# Patient Record
Sex: MALE | Race: WHITE | NOT HISPANIC OR LATINO | Employment: FULL TIME | URBAN - NONMETROPOLITAN AREA
[De-identification: names, ages, dates, MRNs, and addresses within clinical notes are randomized per-mention and may not be internally consistent; named-entity substitution may affect disease eponyms.]

---

## 2020-11-24 ENCOUNTER — APPOINTMENT (EMERGENCY)
Dept: CT IMAGING | Facility: HOSPITAL | Age: 60
End: 2020-11-24
Payer: COMMERCIAL

## 2020-11-24 ENCOUNTER — HOSPITAL ENCOUNTER (INPATIENT)
Facility: HOSPITAL | Age: 60
LOS: 1 days | Discharge: HOME | DRG: 287 | End: 2020-11-25
Attending: FAMILY MEDICINE | Admitting: INTERNAL MEDICINE
Payer: COMMERCIAL

## 2020-11-24 ENCOUNTER — HOSPITAL ENCOUNTER (EMERGENCY)
Facility: HOSPITAL | Age: 60
End: 2020-11-24
Attending: EMERGENCY MEDICINE | Admitting: EMERGENCY MEDICINE
Payer: COMMERCIAL

## 2020-11-24 ENCOUNTER — APPOINTMENT (EMERGENCY)
Dept: RADIOLOGY | Facility: HOSPITAL | Age: 60
End: 2020-11-24
Payer: COMMERCIAL

## 2020-11-24 VITALS
DIASTOLIC BLOOD PRESSURE: 78 MMHG | WEIGHT: 201.06 LBS | BODY MASS INDEX: 27.27 KG/M2 | RESPIRATION RATE: 20 BRPM | OXYGEN SATURATION: 98 % | HEART RATE: 77 BPM | SYSTOLIC BLOOD PRESSURE: 128 MMHG | TEMPERATURE: 97.3 F

## 2020-11-24 DIAGNOSIS — I21.4 NSTEMI (NON-ST ELEVATED MYOCARDIAL INFARCTION) (HCC): ICD-10-CM

## 2020-11-24 DIAGNOSIS — R94.31 ST SEGMENT DEPRESSION: ICD-10-CM

## 2020-11-24 DIAGNOSIS — R07.82 INTERCOSTAL PAIN: Primary | ICD-10-CM

## 2020-11-24 DIAGNOSIS — K21.9 GASTROESOPHAGEAL REFLUX DISEASE WITHOUT ESOPHAGITIS: Chronic | ICD-10-CM

## 2020-11-24 DIAGNOSIS — R77.8 ELEVATED TROPONIN: ICD-10-CM

## 2020-11-24 DIAGNOSIS — R07.9 CHEST PAIN: Primary | ICD-10-CM

## 2020-11-24 PROBLEM — Z72.0 TOBACCO ABUSE: Chronic | Status: ACTIVE | Noted: 2020-11-24

## 2020-11-24 LAB
ALBUMIN SERPL BCP-MCNC: 4 G/DL (ref 3.5–5)
ALP SERPL-CCNC: 62 U/L (ref 46–116)
ALT SERPL W P-5'-P-CCNC: 37 U/L (ref 12–78)
ANION GAP SERPL CALCULATED.3IONS-SCNC: 13 MMOL/L (ref 4–13)
APTT PPP: 24 SECONDS (ref 23–37)
AST SERPL W P-5'-P-CCNC: 18 U/L (ref 5–45)
BASOPHILS # BLD AUTO: 0.07 THOUSANDS/ΜL (ref 0–0.1)
BASOPHILS NFR BLD AUTO: 1 % (ref 0–1)
BILIRUB SERPL-MCNC: 0.6 MG/DL (ref 0.2–1)
BUN SERPL-MCNC: 12 MG/DL (ref 5–25)
CALCIUM SERPL-MCNC: 9.5 MG/DL (ref 8.3–10.1)
CHLORIDE SERPL-SCNC: 97 MMOL/L (ref 100–108)
CO2 SERPL-SCNC: 23 MMOL/L (ref 21–32)
CREAT SERPL-MCNC: 1.09 MG/DL (ref 0.6–1.3)
EOSINOPHIL # BLD AUTO: 0.35 THOUSAND/ΜL (ref 0–0.61)
EOSINOPHIL NFR BLD AUTO: 2 % (ref 0–6)
ERYTHROCYTE [DISTWIDTH] IN BLOOD BY AUTOMATED COUNT: 12 % (ref 11.6–15.1)
GFR SERPL CREATININE-BSD FRML MDRD: 74 ML/MIN/1.73SQ M
GLUCOSE SERPL-MCNC: 127 MG/DL (ref 65–140)
HCT VFR BLD AUTO: 46.4 % (ref 36.5–49.3)
HGB BLD-MCNC: 16.3 G/DL (ref 12–17)
IMM GRANULOCYTES # BLD AUTO: 0.19 THOUSAND/UL (ref 0–0.2)
IMM GRANULOCYTES NFR BLD AUTO: 1 % (ref 0–2)
INR PPP: 0.97 (ref 0.84–1.19)
LIPASE SERPL-CCNC: 102 U/L (ref 73–393)
LYMPHOCYTES # BLD AUTO: 5.49 THOUSANDS/ΜL (ref 0.6–4.47)
LYMPHOCYTES NFR BLD AUTO: 37 % (ref 14–44)
MCH RBC QN AUTO: 34 PG (ref 26.8–34.3)
MCHC RBC AUTO-ENTMCNC: 35.1 G/DL (ref 31.4–37.4)
MCV RBC AUTO: 97 FL (ref 82–98)
MONOCYTES # BLD AUTO: 1.52 THOUSAND/ΜL (ref 0.17–1.22)
MONOCYTES NFR BLD AUTO: 10 % (ref 4–12)
NEUTROPHILS # BLD AUTO: 7.07 THOUSANDS/ΜL (ref 1.85–7.62)
NEUTS SEG NFR BLD AUTO: 49 % (ref 43–75)
NRBC BLD AUTO-RTO: 0 /100 WBCS
PLATELET # BLD AUTO: 312 THOUSANDS/UL (ref 149–390)
PMV BLD AUTO: 9.8 FL (ref 8.9–12.7)
POTASSIUM SERPL-SCNC: 3.6 MMOL/L (ref 3.5–5.3)
PROT SERPL-MCNC: 7.9 G/DL (ref 6.4–8.2)
PROTHROMBIN TIME: 12.7 SECONDS (ref 11.6–14.5)
RBC # BLD AUTO: 4.8 MILLION/UL (ref 3.88–5.62)
SODIUM SERPL-SCNC: 133 MMOL/L (ref 136–145)
TROPONIN I SERPL-MCNC: 0.64 NG/ML
TROPONIN I SERPL-MCNC: 1.29 NG/ML
TROPONIN I SERPL-MCNC: 1.97 NG/ML
TROPONIN I SERPL-MCNC: <0.02 NG/ML
WBC # BLD AUTO: 14.69 THOUSAND/UL (ref 4.31–10.16)

## 2020-11-24 PROCEDURE — 99285 EMERGENCY DEPT VISIT HI MDM: CPT

## 2020-11-24 PROCEDURE — 84484 ASSAY OF TROPONIN QUANT: CPT | Performed by: INTERNAL MEDICINE

## 2020-11-24 PROCEDURE — 99223 1ST HOSP IP/OBS HIGH 75: CPT | Performed by: INTERNAL MEDICINE

## 2020-11-24 PROCEDURE — 74174 CTA ABD&PLVS W/CONTRAST: CPT

## 2020-11-24 PROCEDURE — G1004 CDSM NDSC: HCPCS

## 2020-11-24 PROCEDURE — 96376 TX/PRO/DX INJ SAME DRUG ADON: CPT

## 2020-11-24 PROCEDURE — 85025 COMPLETE CBC W/AUTO DIFF WBC: CPT | Performed by: EMERGENCY MEDICINE

## 2020-11-24 PROCEDURE — B2011ZZ PLAIN RADIOGRAPHY OF MULTIPLE CORONARY ARTERIES USING LOW OSMOLAR CONTRAST: ICD-10-PCS | Performed by: INTERNAL MEDICINE

## 2020-11-24 PROCEDURE — 71275 CT ANGIOGRAPHY CHEST: CPT

## 2020-11-24 PROCEDURE — 83690 ASSAY OF LIPASE: CPT | Performed by: EMERGENCY MEDICINE

## 2020-11-24 PROCEDURE — 4A023N7 MEASUREMENT OF CARDIAC SAMPLING AND PRESSURE, LEFT HEART, PERCUTANEOUS APPROACH: ICD-10-PCS | Performed by: INTERNAL MEDICINE

## 2020-11-24 PROCEDURE — 84484 ASSAY OF TROPONIN QUANT: CPT | Performed by: EMERGENCY MEDICINE

## 2020-11-24 PROCEDURE — 85730 THROMBOPLASTIN TIME PARTIAL: CPT | Performed by: PHYSICIAN ASSISTANT

## 2020-11-24 PROCEDURE — 85610 PROTHROMBIN TIME: CPT | Performed by: PHYSICIAN ASSISTANT

## 2020-11-24 PROCEDURE — 80053 COMPREHEN METABOLIC PANEL: CPT | Performed by: EMERGENCY MEDICINE

## 2020-11-24 PROCEDURE — 96374 THER/PROPH/DIAG INJ IV PUSH: CPT

## 2020-11-24 PROCEDURE — 36415 COLL VENOUS BLD VENIPUNCTURE: CPT | Performed by: EMERGENCY MEDICINE

## 2020-11-24 PROCEDURE — 93005 ELECTROCARDIOGRAM TRACING: CPT

## 2020-11-24 PROCEDURE — 99291 CRITICAL CARE FIRST HOUR: CPT | Performed by: EMERGENCY MEDICINE

## 2020-11-24 RX ORDER — NITROGLYCERIN 0.4 MG/1
0.4 TABLET SUBLINGUAL
Status: DISCONTINUED | OUTPATIENT
Start: 2020-11-24 | End: 2020-11-25

## 2020-11-24 RX ORDER — HEPARIN SODIUM 5000 [USP'U]/ML
5000 INJECTION, SOLUTION INTRAVENOUS; SUBCUTANEOUS EVERY 8 HOURS SCHEDULED
Status: DISCONTINUED | OUTPATIENT
Start: 2020-11-24 | End: 2020-11-24 | Stop reason: SDUPTHER

## 2020-11-24 RX ORDER — SODIUM CHLORIDE 9 MG/ML
100 INJECTION, SOLUTION INTRAVENOUS ONCE
Status: COMPLETED | OUTPATIENT
Start: 2020-11-25 | End: 2020-11-25

## 2020-11-24 RX ORDER — ASPIRIN 81 MG/1
81 TABLET ORAL DAILY
Status: DISCONTINUED | OUTPATIENT
Start: 2020-11-25 | End: 2020-11-25 | Stop reason: HOSPADM

## 2020-11-24 RX ORDER — HEPARIN SODIUM 1000 [USP'U]/ML
4000 INJECTION, SOLUTION INTRAVENOUS; SUBCUTANEOUS ONCE
Status: COMPLETED | OUTPATIENT
Start: 2020-11-24 | End: 2020-11-24

## 2020-11-24 RX ORDER — MORPHINE SULFATE 4 MG/ML
4 INJECTION, SOLUTION INTRAMUSCULAR; INTRAVENOUS ONCE
Status: COMPLETED | OUTPATIENT
Start: 2020-11-24 | End: 2020-11-24

## 2020-11-24 RX ORDER — HEPARIN SODIUM 10000 [USP'U]/100ML
3-20 INJECTION, SOLUTION INTRAVENOUS
Status: DISCONTINUED | OUTPATIENT
Start: 2020-11-24 | End: 2020-11-25

## 2020-11-24 RX ORDER — SODIUM CHLORIDE 9 MG/ML
3 INJECTION INTRAVENOUS
Status: DISCONTINUED | OUTPATIENT
Start: 2020-11-24 | End: 2020-11-24 | Stop reason: HOSPADM

## 2020-11-24 RX ORDER — OMEPRAZOLE 20 MG/1
1 CAPSULE, DELAYED RELEASE ORAL DAILY
COMMUNITY
End: 2020-11-25 | Stop reason: HOSPADM

## 2020-11-24 RX ORDER — CLOPIDOGREL BISULFATE 75 MG/1
300 TABLET ORAL ONCE
Status: COMPLETED | OUTPATIENT
Start: 2020-11-24 | End: 2020-11-24

## 2020-11-24 RX ORDER — ASPIRIN 81 MG/1
324 TABLET, CHEWABLE ORAL ONCE
Status: COMPLETED | OUTPATIENT
Start: 2020-11-24 | End: 2020-11-24

## 2020-11-24 RX ORDER — MAGNESIUM HYDROXIDE/ALUMINUM HYDROXICE/SIMETHICONE 120; 1200; 1200 MG/30ML; MG/30ML; MG/30ML
30 SUSPENSION ORAL EVERY 2 HOUR PRN
Status: DISCONTINUED | OUTPATIENT
Start: 2020-11-24 | End: 2020-11-25 | Stop reason: HOSPADM

## 2020-11-24 RX ORDER — MAGNESIUM HYDROXIDE/ALUMINUM HYDROXICE/SIMETHICONE 120; 1200; 1200 MG/30ML; MG/30ML; MG/30ML
30 SUSPENSION ORAL ONCE
Status: COMPLETED | OUTPATIENT
Start: 2020-11-24 | End: 2020-11-24

## 2020-11-24 RX ORDER — HEPARIN SODIUM 1000 [USP'U]/ML
2000 INJECTION, SOLUTION INTRAVENOUS; SUBCUTANEOUS
Status: DISCONTINUED | OUTPATIENT
Start: 2020-11-24 | End: 2020-11-25

## 2020-11-24 RX ORDER — CLOPIDOGREL BISULFATE 75 MG/1
75 TABLET ORAL DAILY
Status: DISCONTINUED | OUTPATIENT
Start: 2020-11-25 | End: 2020-11-25

## 2020-11-24 RX ORDER — PANTOPRAZOLE SODIUM 20 MG/1
20 TABLET, DELAYED RELEASE ORAL
Status: DISCONTINUED | OUTPATIENT
Start: 2020-11-24 | End: 2020-11-25 | Stop reason: HOSPADM

## 2020-11-24 RX ORDER — HEPARIN SODIUM 1000 [USP'U]/ML
4000 INJECTION, SOLUTION INTRAVENOUS; SUBCUTANEOUS
Status: DISCONTINUED | OUTPATIENT
Start: 2020-11-24 | End: 2020-11-25

## 2020-11-24 RX ORDER — MAGNESIUM HYDROXIDE/ALUMINUM HYDROXICE/SIMETHICONE 120; 1200; 1200 MG/30ML; MG/30ML; MG/30ML
30 SUSPENSION ORAL EVERY 4 HOURS PRN
Status: DISCONTINUED | OUTPATIENT
Start: 2020-11-24 | End: 2020-11-24

## 2020-11-24 RX ADMIN — PANTOPRAZOLE SODIUM 20 MG: 20 TABLET, DELAYED RELEASE ORAL at 17:39

## 2020-11-24 RX ADMIN — HEPARIN SODIUM 4000 UNITS: 1000 INJECTION INTRAVENOUS; SUBCUTANEOUS at 23:47

## 2020-11-24 RX ADMIN — MORPHINE SULFATE 4 MG: 4 INJECTION, SOLUTION INTRAMUSCULAR; INTRAVENOUS at 11:12

## 2020-11-24 RX ADMIN — MORPHINE SULFATE 4 MG: 4 INJECTION, SOLUTION INTRAMUSCULAR; INTRAVENOUS at 09:07

## 2020-11-24 RX ADMIN — HEPARIN SODIUM 11.1 UNITS/KG/HR: 10000 INJECTION, SOLUTION INTRAVENOUS at 23:46

## 2020-11-24 RX ADMIN — ASPIRIN 81 MG CHEWABLE TABLET 324 MG: 81 TABLET CHEWABLE at 14:20

## 2020-11-24 RX ADMIN — ALUMINUM HYDROXIDE, MAGNESIUM HYDROXIDE, AND SIMETHICONE 30 ML: 200; 200; 20 SUSPENSION ORAL at 11:12

## 2020-11-24 RX ADMIN — CLOPIDOGREL BISULFATE 300 MG: 75 TABLET ORAL at 17:39

## 2020-11-24 RX ADMIN — IOHEXOL 100 ML: 350 INJECTION, SOLUTION INTRAVENOUS at 10:22

## 2020-11-24 RX ADMIN — HEPARIN SODIUM 5000 UNITS: 5000 INJECTION INTRAVENOUS; SUBCUTANEOUS at 17:40

## 2020-11-24 RX ADMIN — ALUMINUM HYDROXIDE, MAGNESIUM HYDROXIDE, AND SIMETHICONE 30 ML: 200; 200; 20 SUSPENSION ORAL at 17:38

## 2020-11-25 ENCOUNTER — APPOINTMENT (INPATIENT)
Dept: NON INVASIVE DIAGNOSTICS | Facility: HOSPITAL | Age: 60
DRG: 287 | End: 2020-11-25
Payer: COMMERCIAL

## 2020-11-25 VITALS
SYSTOLIC BLOOD PRESSURE: 140 MMHG | RESPIRATION RATE: 18 BRPM | OXYGEN SATURATION: 99 % | DIASTOLIC BLOOD PRESSURE: 81 MMHG | TEMPERATURE: 97.5 F | HEART RATE: 80 BPM

## 2020-11-25 LAB
ANION GAP SERPL CALCULATED.3IONS-SCNC: 8 MMOL/L (ref 4–13)
APTT PPP: 54 SECONDS (ref 23–37)
ATRIAL RATE: 72 BPM
ATRIAL RATE: 85 BPM
BUN SERPL-MCNC: 10 MG/DL (ref 5–25)
CALCIUM SERPL-MCNC: 9.4 MG/DL (ref 8.3–10.1)
CHLORIDE SERPL-SCNC: 99 MMOL/L (ref 100–108)
CHOLEST SERPL-MCNC: 197 MG/DL (ref 50–200)
CO2 SERPL-SCNC: 25 MMOL/L (ref 21–32)
CREAT SERPL-MCNC: 0.85 MG/DL (ref 0.6–1.3)
GFR SERPL CREATININE-BSD FRML MDRD: 95 ML/MIN/1.73SQ M
GLUCOSE SERPL-MCNC: 106 MG/DL (ref 65–140)
HDLC SERPL-MCNC: 45 MG/DL
LDLC SERPL CALC-MCNC: 137 MG/DL (ref 0–100)
NONHDLC SERPL-MCNC: 152 MG/DL
P AXIS: 68 DEGREES
P AXIS: 71 DEGREES
PLATELET # BLD AUTO: 244 THOUSANDS/UL (ref 149–390)
PMV BLD AUTO: 10 FL (ref 8.9–12.7)
POTASSIUM SERPL-SCNC: 4.3 MMOL/L (ref 3.5–5.3)
PR INTERVAL: 136 MS
PR INTERVAL: 138 MS
QRS AXIS: 65 DEGREES
QRS AXIS: 76 DEGREES
QRSD INTERVAL: 82 MS
QRSD INTERVAL: 88 MS
QT INTERVAL: 368 MS
QT INTERVAL: 390 MS
QTC INTERVAL: 427 MS
QTC INTERVAL: 437 MS
SODIUM SERPL-SCNC: 132 MMOL/L (ref 136–145)
T WAVE AXIS: 131 DEGREES
T WAVE AXIS: 55 DEGREES
TRIGL SERPL-MCNC: 75 MG/DL
TROPONIN I SERPL-MCNC: 3.53 NG/ML
TROPONIN I SERPL-MCNC: 4.15 NG/ML
VENTRICULAR RATE: 72 BPM
VENTRICULAR RATE: 85 BPM

## 2020-11-25 PROCEDURE — 99153 MOD SED SAME PHYS/QHP EA: CPT | Performed by: PHYSICIAN ASSISTANT

## 2020-11-25 PROCEDURE — C1769 GUIDE WIRE: HCPCS | Performed by: PHYSICIAN ASSISTANT

## 2020-11-25 PROCEDURE — 99152 MOD SED SAME PHYS/QHP 5/>YRS: CPT | Performed by: PHYSICIAN ASSISTANT

## 2020-11-25 PROCEDURE — 93010 ELECTROCARDIOGRAM REPORT: CPT | Performed by: INTERNAL MEDICINE

## 2020-11-25 PROCEDURE — 84484 ASSAY OF TROPONIN QUANT: CPT | Performed by: INTERNAL MEDICINE

## 2020-11-25 PROCEDURE — 93458 L HRT ARTERY/VENTRICLE ANGIO: CPT | Performed by: PHYSICIAN ASSISTANT

## 2020-11-25 PROCEDURE — 80048 BASIC METABOLIC PNL TOTAL CA: CPT | Performed by: INTERNAL MEDICINE

## 2020-11-25 PROCEDURE — 99152 MOD SED SAME PHYS/QHP 5/>YRS: CPT | Performed by: INTERNAL MEDICINE

## 2020-11-25 PROCEDURE — 85730 THROMBOPLASTIN TIME PARTIAL: CPT | Performed by: INTERNAL MEDICINE

## 2020-11-25 PROCEDURE — 80061 LIPID PANEL: CPT | Performed by: INTERNAL MEDICINE

## 2020-11-25 PROCEDURE — 99232 SBSQ HOSP IP/OBS MODERATE 35: CPT | Performed by: INTERNAL MEDICINE

## 2020-11-25 PROCEDURE — 85049 AUTOMATED PLATELET COUNT: CPT | Performed by: INTERNAL MEDICINE

## 2020-11-25 PROCEDURE — 99239 HOSP IP/OBS DSCHRG MGMT >30: CPT | Performed by: INTERNAL MEDICINE

## 2020-11-25 PROCEDURE — NC001 PR NO CHARGE: Performed by: INTERNAL MEDICINE

## 2020-11-25 PROCEDURE — C1894 INTRO/SHEATH, NON-LASER: HCPCS | Performed by: PHYSICIAN ASSISTANT

## 2020-11-25 PROCEDURE — 93458 L HRT ARTERY/VENTRICLE ANGIO: CPT | Performed by: INTERNAL MEDICINE

## 2020-11-25 RX ORDER — METOPROLOL SUCCINATE 25 MG/1
25 TABLET, EXTENDED RELEASE ORAL DAILY
Status: DISCONTINUED | OUTPATIENT
Start: 2020-11-25 | End: 2020-11-25

## 2020-11-25 RX ORDER — MAGNESIUM HYDROXIDE/ALUMINUM HYDROXICE/SIMETHICONE 120; 1200; 1200 MG/30ML; MG/30ML; MG/30ML
30 SUSPENSION ORAL EVERY 2 HOUR PRN
Qty: 355 ML | Refills: 0
Start: 2020-11-25

## 2020-11-25 RX ORDER — HEPARIN SODIUM 1000 [USP'U]/ML
INJECTION, SOLUTION INTRAVENOUS; SUBCUTANEOUS CODE/TRAUMA/SEDATION MEDICATION
Status: COMPLETED | OUTPATIENT
Start: 2020-11-25 | End: 2020-11-25

## 2020-11-25 RX ORDER — LIDOCAINE HYDROCHLORIDE 10 MG/ML
INJECTION, SOLUTION EPIDURAL; INFILTRATION; INTRACAUDAL; PERINEURAL CODE/TRAUMA/SEDATION MEDICATION
Status: COMPLETED | OUTPATIENT
Start: 2020-11-25 | End: 2020-11-25

## 2020-11-25 RX ORDER — METOPROLOL SUCCINATE 25 MG/1
25 TABLET, EXTENDED RELEASE ORAL DAILY
Status: DISCONTINUED | OUTPATIENT
Start: 2020-11-26 | End: 2020-11-25 | Stop reason: HOSPADM

## 2020-11-25 RX ORDER — METOPROLOL SUCCINATE 25 MG/1
25 TABLET, EXTENDED RELEASE ORAL DAILY
Qty: 30 TABLET | Refills: 0 | Status: SHIPPED | OUTPATIENT
Start: 2020-11-25

## 2020-11-25 RX ORDER — ATORVASTATIN CALCIUM 20 MG/1
20 TABLET, FILM COATED ORAL
Qty: 30 TABLET | Refills: 0 | Status: SHIPPED | OUTPATIENT
Start: 2020-11-25 | End: 2020-12-01

## 2020-11-25 RX ORDER — ATORVASTATIN CALCIUM 10 MG/1
20 TABLET, FILM COATED ORAL
Status: DISCONTINUED | OUTPATIENT
Start: 2020-11-25 | End: 2020-11-25 | Stop reason: HOSPADM

## 2020-11-25 RX ORDER — NITROGLYCERIN 20 MG/100ML
INJECTION INTRAVENOUS CODE/TRAUMA/SEDATION MEDICATION
Status: COMPLETED | OUTPATIENT
Start: 2020-11-25 | End: 2020-11-25

## 2020-11-25 RX ORDER — PANTOPRAZOLE SODIUM 20 MG/1
20 TABLET, DELAYED RELEASE ORAL
Qty: 60 TABLET | Refills: 0 | Status: SHIPPED | OUTPATIENT
Start: 2020-11-25 | End: 2020-12-08 | Stop reason: ALTCHOICE

## 2020-11-25 RX ORDER — MIDAZOLAM HYDROCHLORIDE 2 MG/2ML
INJECTION, SOLUTION INTRAMUSCULAR; INTRAVENOUS CODE/TRAUMA/SEDATION MEDICATION
Status: COMPLETED | OUTPATIENT
Start: 2020-11-25 | End: 2020-11-25

## 2020-11-25 RX ORDER — FENTANYL CITRATE 50 UG/ML
INJECTION, SOLUTION INTRAMUSCULAR; INTRAVENOUS CODE/TRAUMA/SEDATION MEDICATION
Status: COMPLETED | OUTPATIENT
Start: 2020-11-25 | End: 2020-11-25

## 2020-11-25 RX ORDER — PANTOPRAZOLE SODIUM 20 MG/1
20 TABLET, DELAYED RELEASE ORAL
Qty: 60 TABLET | Refills: 0 | Status: SHIPPED | OUTPATIENT
Start: 2020-11-25 | End: 2020-11-25

## 2020-11-25 RX ORDER — VERAPAMIL HYDROCHLORIDE 2.5 MG/ML
INJECTION, SOLUTION INTRAVENOUS CODE/TRAUMA/SEDATION MEDICATION
Status: COMPLETED | OUTPATIENT
Start: 2020-11-25 | End: 2020-11-25

## 2020-11-25 RX ORDER — ASPIRIN 81 MG/1
81 TABLET ORAL DAILY
Refills: 0
Start: 2020-11-26

## 2020-11-25 RX ORDER — SODIUM CHLORIDE 9 MG/ML
125 INJECTION, SOLUTION INTRAVENOUS CONTINUOUS
Status: DISCONTINUED | OUTPATIENT
Start: 2020-11-25 | End: 2020-11-25

## 2020-11-25 RX ORDER — METOPROLOL SUCCINATE 25 MG/1
25 TABLET, EXTENDED RELEASE ORAL 2 TIMES DAILY
Status: DISCONTINUED | OUTPATIENT
Start: 2020-11-26 | End: 2020-11-25

## 2020-11-25 RX ADMIN — FENTANYL CITRATE 50 MCG: 50 INJECTION, SOLUTION INTRAMUSCULAR; INTRAVENOUS at 08:21

## 2020-11-25 RX ADMIN — IOHEXOL 63 ML: 350 INJECTION, SOLUTION INTRAVENOUS at 08:39

## 2020-11-25 RX ADMIN — ATORVASTATIN CALCIUM 20 MG: 10 TABLET, FILM COATED ORAL at 16:02

## 2020-11-25 RX ADMIN — METOPROLOL SUCCINATE 25 MG: 25 TABLET, EXTENDED RELEASE ORAL at 16:02

## 2020-11-25 RX ADMIN — LIDOCAINE HYDROCHLORIDE 1 ML: 10 INJECTION, SOLUTION EPIDURAL; INFILTRATION; INTRACAUDAL; PERINEURAL at 08:23

## 2020-11-25 RX ADMIN — NITROGLYCERIN 200 MCG: 20 INJECTION INTRAVENOUS at 08:27

## 2020-11-25 RX ADMIN — PANTOPRAZOLE SODIUM 20 MG: 20 TABLET, DELAYED RELEASE ORAL at 16:02

## 2020-11-25 RX ADMIN — MIDAZOLAM 2 MG: 1 INJECTION INTRAMUSCULAR; INTRAVENOUS at 08:21

## 2020-11-25 RX ADMIN — HEPARIN SODIUM 4000 UNITS: 1000 INJECTION INTRAVENOUS; SUBCUTANEOUS at 08:26

## 2020-11-25 RX ADMIN — SODIUM CHLORIDE 125 ML/HR: 0.9 INJECTION, SOLUTION INTRAVENOUS at 09:00

## 2020-11-25 RX ADMIN — CLOPIDOGREL BISULFATE 75 MG: 75 TABLET ORAL at 08:02

## 2020-11-25 RX ADMIN — SODIUM CHLORIDE 100 ML/HR: 0.9 INJECTION, SOLUTION INTRAVENOUS at 06:23

## 2020-11-25 RX ADMIN — PANTOPRAZOLE SODIUM 20 MG: 20 TABLET, DELAYED RELEASE ORAL at 06:23

## 2020-11-25 RX ADMIN — VERAPAMIL HYDROCHLORIDE 2.5 MG: 2.5 INJECTION, SOLUTION INTRAVENOUS at 08:27

## 2020-11-25 RX ADMIN — ASPIRIN 81 MG: 81 TABLET, COATED ORAL at 08:02

## 2020-11-27 ENCOUNTER — TRANSITIONAL CARE MANAGEMENT (OUTPATIENT)
Dept: FAMILY MEDICINE CLINIC | Facility: CLINIC | Age: 60
End: 2020-11-27

## 2020-12-01 ENCOUNTER — TELEPHONE (OUTPATIENT)
Dept: FAMILY MEDICINE CLINIC | Facility: CLINIC | Age: 60
End: 2020-12-01

## 2020-12-01 ENCOUNTER — OFFICE VISIT (OUTPATIENT)
Dept: FAMILY MEDICINE CLINIC | Facility: CLINIC | Age: 60
End: 2020-12-01
Payer: COMMERCIAL

## 2020-12-01 VITALS
SYSTOLIC BLOOD PRESSURE: 116 MMHG | TEMPERATURE: 98 F | WEIGHT: 204.4 LBS | HEIGHT: 72 IN | DIASTOLIC BLOOD PRESSURE: 80 MMHG | OXYGEN SATURATION: 99 % | HEART RATE: 82 BPM | BODY MASS INDEX: 27.68 KG/M2

## 2020-12-01 DIAGNOSIS — Z71.6 ENCOUNTER FOR SMOKING CESSATION COUNSELING: Primary | ICD-10-CM

## 2020-12-01 DIAGNOSIS — I21.4 NSTEMI (NON-ST ELEVATED MYOCARDIAL INFARCTION) (HCC): ICD-10-CM

## 2020-12-01 DIAGNOSIS — Z76.89 ENCOUNTER FOR SUPPORT AND COORDINATION OF TRANSITION OF CARE: ICD-10-CM

## 2020-12-01 PROCEDURE — 99496 TRANSJ CARE MGMT HIGH F2F 7D: CPT | Performed by: FAMILY MEDICINE

## 2020-12-01 RX ORDER — VARENICLINE TARTRATE 25 MG
KIT ORAL
Qty: 53 TABLET | Refills: 0 | Status: SHIPPED | OUTPATIENT
Start: 2020-12-01 | End: 2020-12-16 | Stop reason: ALTCHOICE

## 2020-12-01 RX ORDER — ATORVASTATIN CALCIUM 40 MG/1
40 TABLET, FILM COATED ORAL
Qty: 30 TABLET | Refills: 1 | Status: SHIPPED | OUTPATIENT
Start: 2020-12-01

## 2020-12-01 RX ORDER — NAPROXEN 500 MG/1
500 TABLET ORAL 2 TIMES DAILY WITH MEALS
COMMUNITY
Start: 2020-11-19 | End: 2020-12-08 | Stop reason: ALTCHOICE

## 2020-12-01 RX ORDER — ATORVASTATIN CALCIUM 40 MG/1
40 TABLET, FILM COATED ORAL
Qty: 30 TABLET | Refills: 1 | Status: SHIPPED | OUTPATIENT
Start: 2020-12-01 | End: 2020-12-01 | Stop reason: SDUPTHER

## 2020-12-04 ENCOUNTER — HOSPITAL ENCOUNTER (OUTPATIENT)
Dept: NON INVASIVE DIAGNOSTICS | Facility: HOSPITAL | Age: 60
Discharge: HOME | End: 2020-12-04
Payer: COMMERCIAL

## 2020-12-04 DIAGNOSIS — I21.4 NSTEMI (NON-ST ELEVATED MYOCARDIAL INFARCTION) (HCC): ICD-10-CM

## 2020-12-04 PROCEDURE — 93306 TTE W/DOPPLER COMPLETE: CPT | Performed by: INTERNAL MEDICINE

## 2020-12-04 PROCEDURE — 93306 TTE W/DOPPLER COMPLETE: CPT

## 2020-12-08 ENCOUNTER — OFFICE VISIT (OUTPATIENT)
Dept: CARDIOLOGY CLINIC | Facility: HOSPITAL | Age: 60
End: 2020-12-08
Payer: COMMERCIAL

## 2020-12-08 VITALS
HEART RATE: 91 BPM | HEIGHT: 72 IN | OXYGEN SATURATION: 98 % | DIASTOLIC BLOOD PRESSURE: 76 MMHG | WEIGHT: 205 LBS | BODY MASS INDEX: 27.77 KG/M2 | SYSTOLIC BLOOD PRESSURE: 124 MMHG

## 2020-12-08 DIAGNOSIS — Z72.0 TOBACCO ABUSE: Chronic | ICD-10-CM

## 2020-12-08 DIAGNOSIS — E78.5 DYSLIPIDEMIA: Primary | ICD-10-CM

## 2020-12-08 PROCEDURE — 99214 OFFICE O/P EST MOD 30 MIN: CPT | Performed by: PHYSICIAN ASSISTANT

## 2020-12-08 PROCEDURE — 4004F PT TOBACCO SCREEN RCVD TLK: CPT | Performed by: PHYSICIAN ASSISTANT

## 2020-12-08 RX ORDER — OMEPRAZOLE 20 MG/1
20 CAPSULE, DELAYED RELEASE ORAL DAILY
COMMUNITY

## 2020-12-16 ENCOUNTER — OFFICE VISIT (OUTPATIENT)
Dept: FAMILY MEDICINE CLINIC | Facility: CLINIC | Age: 60
End: 2020-12-16
Payer: COMMERCIAL

## 2020-12-16 VITALS
WEIGHT: 208.6 LBS | SYSTOLIC BLOOD PRESSURE: 118 MMHG | HEIGHT: 72 IN | TEMPERATURE: 97.2 F | OXYGEN SATURATION: 93 % | DIASTOLIC BLOOD PRESSURE: 82 MMHG | HEART RATE: 88 BPM | BODY MASS INDEX: 28.25 KG/M2

## 2020-12-16 DIAGNOSIS — Z71.6 ENCOUNTER FOR TOBACCO USE CESSATION COUNSELING: Primary | ICD-10-CM

## 2020-12-16 DIAGNOSIS — I21.4 NSTEMI (NON-ST ELEVATED MYOCARDIAL INFARCTION) (HCC): ICD-10-CM

## 2020-12-16 DIAGNOSIS — Z72.0 TOBACCO ABUSE: Chronic | ICD-10-CM

## 2020-12-16 PROCEDURE — 99213 OFFICE O/P EST LOW 20 MIN: CPT | Performed by: FAMILY MEDICINE

## 2020-12-16 PROCEDURE — 3008F BODY MASS INDEX DOCD: CPT | Performed by: PHYSICIAN ASSISTANT

## 2020-12-16 RX ORDER — POLYETHYLENE GLYCOL 3350 17 G
4 POWDER IN PACKET (EA) ORAL AS NEEDED
Qty: 100 EACH | Refills: 0 | Status: SHIPPED | OUTPATIENT
Start: 2020-12-16

## 2020-12-16 RX ORDER — NICOTINE 21 MG/24HR
1 PATCH, TRANSDERMAL 24 HOURS TRANSDERMAL EVERY 24 HOURS
Qty: 28 PATCH | Refills: 0 | Status: SHIPPED | OUTPATIENT
Start: 2020-12-16

## 2021-06-11 ENCOUNTER — TELEPHONE (OUTPATIENT)
Dept: CARDIOLOGY CLINIC | Facility: HOSPITAL | Age: 61
End: 2021-06-11

## 2021-06-11 NOTE — TELEPHONE ENCOUNTER
Attempted to contact Alice Cabello multiple times to schedule his next appointment with cardiology  A letter has been sent to have him contact our office

## 2021-11-22 ENCOUNTER — VBI (OUTPATIENT)
Dept: ADMINISTRATIVE | Facility: OTHER | Age: 61
End: 2021-11-22

## 2021-12-06 PROCEDURE — U0005 INFEC AGEN DETEC AMPLI PROBE: HCPCS | Performed by: FAMILY MEDICINE

## 2021-12-06 PROCEDURE — U0003 INFECTIOUS AGENT DETECTION BY NUCLEIC ACID (DNA OR RNA); SEVERE ACUTE RESPIRATORY SYNDROME CORONAVIRUS 2 (SARS-COV-2) (CORONAVIRUS DISEASE [COVID-19]), AMPLIFIED PROBE TECHNIQUE, MAKING USE OF HIGH THROUGHPUT TECHNOLOGIES AS DESCRIBED BY CMS-2020-01-R: HCPCS | Performed by: FAMILY MEDICINE

## 2022-05-25 ENCOUNTER — OFFICE VISIT (OUTPATIENT)
Dept: URGENT CARE | Facility: MEDICAL CENTER | Age: 62
End: 2022-05-25
Payer: COMMERCIAL

## 2022-05-25 VITALS
WEIGHT: 200 LBS | RESPIRATION RATE: 18 BRPM | TEMPERATURE: 98.3 F | BODY MASS INDEX: 27.12 KG/M2 | OXYGEN SATURATION: 99 % | HEART RATE: 83 BPM

## 2022-05-25 DIAGNOSIS — Z20.822 CLOSE EXPOSURE TO COVID-19 VIRUS: Primary | ICD-10-CM

## 2022-05-25 PROCEDURE — U0003 INFECTIOUS AGENT DETECTION BY NUCLEIC ACID (DNA OR RNA); SEVERE ACUTE RESPIRATORY SYNDROME CORONAVIRUS 2 (SARS-COV-2) (CORONAVIRUS DISEASE [COVID-19]), AMPLIFIED PROBE TECHNIQUE, MAKING USE OF HIGH THROUGHPUT TECHNOLOGIES AS DESCRIBED BY CMS-2020-01-R: HCPCS | Performed by: PHYSICIAN ASSISTANT

## 2022-05-25 PROCEDURE — U0005 INFEC AGEN DETEC AMPLI PROBE: HCPCS | Performed by: PHYSICIAN ASSISTANT

## 2022-05-25 PROCEDURE — 99212 OFFICE O/P EST SF 10 MIN: CPT | Performed by: PHYSICIAN ASSISTANT

## 2022-05-25 NOTE — PROGRESS NOTES
[facility]        NAME: Imer Galloway is a 62 y o  male  : 1960    MRN: [id number]  DATE: May 25, 2022  TIME: 6:08 PM    Assessment and Plan   Close exposure to COVID-19 virus [Z20 [id number]]  1  Close exposure to COVID-19 virus  COVID Only - Collected at [facility]         Patient Instructions     Patient advised to call medical records for a copy of his test since he does not have access to the Internet  Follow up with PCP in 3-5 days  Proceed to  ER if symptoms worsen  Chief Complaint     Chief Complaint   Patient presents with    COVID-19     Exposure with No symptoms         History of Present Illness       Patient's daughter tested positive for COVID 4 days ago  He lives with his daughter  He is not vaccinated for COVID  He was diagnosed with COVID 2021  He remains asymptomatic  When he had his about with COVID he had minimal symptoms  He was told by his employer he was quarantine for 5 days and before returning to work must have a negative COVID test   Patient presents for COVID testing  Review of Systems   Review of Systems   Constitutional: Negative for chills and fever  HENT: Negative for congestion, rhinorrhea and sore throat  Respiratory: Negative for cough and shortness of breath  Gastrointestinal: Negative for diarrhea, nausea and vomiting  Musculoskeletal: Negative for myalgias  Neurological: Negative for headaches           Current Medications       Current Outpatient Medications:     omeprazole (PriLOSEC) 20 mg delayed release capsule, Take 20 mg by mouth daily, Disp: , Rfl:     aluminum-magnesium hydroxide-simethicone (MYLANTA) 200-200-20 mg/5 mL suspension, Take 30 mL by mouth every 2 (two) hours as needed for indigestion or heartburn (Patient not taking: Reported on 2022), Disp: 355 mL, Rfl: 0    aspirin (ECOTRIN LOW STRENGTH) 81 mg EC tablet, Take 1 tablet (81 mg total) by mouth daily (Patient not taking: Reported on 5/25/2022), Disp:  , Rfl: 0    atorvastatin (LIPITOR) 40 mg tablet, Take 1 tablet (40 mg total) by mouth daily with dinner (Patient not taking: Reported on 5/25/2022), Disp: 30 tablet, Rfl: 1    metoprolol succinate (TOPROL-XL) 25 mg 24 hr tablet, Take 1 tablet (25 mg total) by mouth daily (Patient not taking: Reported on 5/25/2022), Disp: 30 tablet, Rfl: 0    nicotine (NICODERM CQ) 14 mg/24hr TD 24 hr patch, Place 1 patch on the skin every 24 hours (Patient not taking: Reported on 5/25/2022), Disp: 28 patch, Rfl: 0    nicotine polacrilex (COMMIT) 4 MG lozenge, Apply 1 lozenge (4 mg total) to the mouth or throat as needed for smoking cessation (Patient not taking: Reported on 5/25/2022), Disp: 100 each, Rfl: 0    nicotine polacrilex (NICORETTE) 4 mg gum, Chew 1 each (4 mg total) as needed for smoking cessation (Patient not taking: Reported on 5/25/2022), Disp: 100 each, Rfl: 0    Current Allergies     Allergies as of 05/25/2022    (No Known Allergies)            The following portions of the patient's history were reviewed and updated as appropriate: allergies, current medications, past family history, past medical history, past social history, past surgical history and problem list      History reviewed  No pertinent past medical history  Past Surgical History:   Procedure Laterality Date    HERNIA REPAIR         History reviewed  No pertinent family history  Medications have been verified  Objective   Pulse 83   Temp 98 3 °F (36 8 °C)   Resp 18   Wt 90 7 kg (200 lb)   SpO2 99%   BMI 27 12 kg/m²   No LMP for male patient  Physical Exam     Physical Exam  Vitals and nursing note reviewed  Constitutional:       Appearance: Normal appearance  HENT:      Head: Normocephalic and atraumatic  Cardiovascular:      Rate and Rhythm: Normal rate and regular rhythm  Heart sounds: Normal heart sounds     Pulmonary:      Effort: Pulmonary effort is normal       Breath sounds: Normal breath sounds  Skin:     General: Skin is warm  Neurological:      Mental Status: He is alert

## 2022-05-26 LAB — SARS-COV-2 RNA RESP QL NAA+PROBE: NEGATIVE

## 2022-08-08 ENCOUNTER — VBI (OUTPATIENT)
Dept: ADMINISTRATIVE | Facility: OTHER | Age: 62
End: 2022-08-08

## 2022-08-08 ENCOUNTER — OCCMED (OUTPATIENT)
Dept: URGENT CARE | Facility: CLINIC | Age: 62
End: 2022-08-08
Payer: OTHER MISCELLANEOUS

## 2022-08-08 ENCOUNTER — HOSPITAL ENCOUNTER (EMERGENCY)
Facility: HOSPITAL | Age: 62
Discharge: HOME | End: 2022-08-08
Attending: EMERGENCY MEDICINE
Payer: COMMERCIAL

## 2022-08-08 VITALS
RESPIRATION RATE: 17 BRPM | TEMPERATURE: 97.4 F | SYSTOLIC BLOOD PRESSURE: 161 MMHG | OXYGEN SATURATION: 100 % | DIASTOLIC BLOOD PRESSURE: 92 MMHG | HEART RATE: 82 BPM

## 2022-08-08 DIAGNOSIS — E86.0 DEHYDRATION: Primary | ICD-10-CM

## 2022-08-08 DIAGNOSIS — M62.81 MUSCLE WEAKNESS: ICD-10-CM

## 2022-08-08 LAB
ALBUMIN SERPL BCP-MCNC: 4.4 G/DL (ref 3.5–5)
ALP SERPL-CCNC: 59 U/L (ref 34–104)
ALT SERPL W P-5'-P-CCNC: 13 U/L (ref 7–52)
ANION GAP SERPL CALCULATED.3IONS-SCNC: 8 MMOL/L (ref 4–13)
AST SERPL W P-5'-P-CCNC: 21 U/L (ref 13–39)
BASOPHILS # BLD AUTO: 0.05 THOUSANDS/ΜL (ref 0–0.1)
BASOPHILS NFR BLD AUTO: 0 % (ref 0–1)
BILIRUB SERPL-MCNC: 0.67 MG/DL (ref 0.2–1)
BUN SERPL-MCNC: 6 MG/DL (ref 5–25)
CALCIUM SERPL-MCNC: 9.3 MG/DL (ref 8.4–10.2)
CHLORIDE SERPL-SCNC: 98 MMOL/L (ref 96–108)
CK SERPL-CCNC: 135 U/L (ref 39–308)
CO2 SERPL-SCNC: 28 MMOL/L (ref 21–32)
CREAT SERPL-MCNC: 0.78 MG/DL (ref 0.6–1.3)
EOSINOPHIL # BLD AUTO: 0.15 THOUSAND/ΜL (ref 0–0.61)
EOSINOPHIL NFR BLD AUTO: 1 % (ref 0–6)
ERYTHROCYTE [DISTWIDTH] IN BLOOD BY AUTOMATED COUNT: 12.9 % (ref 11.6–15.1)
GFR SERPL CREATININE-BSD FRML MDRD: 97 ML/MIN/1.73SQ M
GLUCOSE SERPL-MCNC: 104 MG/DL (ref 65–140)
HCT VFR BLD AUTO: 41.3 % (ref 36.5–49.3)
HGB BLD-MCNC: 14.5 G/DL (ref 12–17)
IMM GRANULOCYTES # BLD AUTO: 0.04 THOUSAND/UL (ref 0–0.2)
IMM GRANULOCYTES NFR BLD AUTO: 0 % (ref 0–2)
LYMPHOCYTES # BLD AUTO: 2.78 THOUSANDS/ΜL (ref 0.6–4.47)
LYMPHOCYTES NFR BLD AUTO: 21 % (ref 14–44)
MCH RBC QN AUTO: 33.9 PG (ref 26.8–34.3)
MCHC RBC AUTO-ENTMCNC: 35.1 G/DL (ref 31.4–37.4)
MCV RBC AUTO: 97 FL (ref 82–98)
MONOCYTES # BLD AUTO: 0.87 THOUSAND/ΜL (ref 0.17–1.22)
MONOCYTES NFR BLD AUTO: 7 % (ref 4–12)
NEUTROPHILS # BLD AUTO: 9.24 THOUSANDS/ΜL (ref 1.85–7.62)
NEUTS SEG NFR BLD AUTO: 71 % (ref 43–75)
NRBC BLD AUTO-RTO: 0 /100 WBCS
PLATELET # BLD AUTO: 245 THOUSANDS/UL (ref 149–390)
PMV BLD AUTO: 9.4 FL (ref 8.9–12.7)
POTASSIUM SERPL-SCNC: 4.1 MMOL/L (ref 3.5–5.3)
PROT SERPL-MCNC: 7.5 G/DL (ref 6.4–8.4)
RBC # BLD AUTO: 4.28 MILLION/UL (ref 3.88–5.62)
SODIUM SERPL-SCNC: 134 MMOL/L (ref 135–147)
WBC # BLD AUTO: 13.13 THOUSAND/UL (ref 4.31–10.16)

## 2022-08-08 PROCEDURE — 85025 COMPLETE CBC W/AUTO DIFF WBC: CPT | Performed by: EMERGENCY MEDICINE

## 2022-08-08 PROCEDURE — 99283 EMERGENCY DEPT VISIT LOW MDM: CPT | Performed by: NURSE PRACTITIONER

## 2022-08-08 PROCEDURE — 82550 ASSAY OF CK (CPK): CPT | Performed by: EMERGENCY MEDICINE

## 2022-08-08 PROCEDURE — 93005 ELECTROCARDIOGRAM TRACING: CPT | Performed by: NURSE PRACTITIONER

## 2022-08-08 PROCEDURE — 36415 COLL VENOUS BLD VENIPUNCTURE: CPT | Performed by: EMERGENCY MEDICINE

## 2022-08-08 PROCEDURE — 96360 HYDRATION IV INFUSION INIT: CPT

## 2022-08-08 PROCEDURE — 80053 COMPREHEN METABOLIC PANEL: CPT | Performed by: EMERGENCY MEDICINE

## 2022-08-08 PROCEDURE — G0382 LEV 3 HOSP TYPE B ED VISIT: HCPCS | Performed by: NURSE PRACTITIONER

## 2022-08-08 PROCEDURE — 99284 EMERGENCY DEPT VISIT MOD MDM: CPT

## 2022-08-08 PROCEDURE — 99284 EMERGENCY DEPT VISIT MOD MDM: CPT | Performed by: EMERGENCY MEDICINE

## 2022-08-08 RX ADMIN — SODIUM CHLORIDE 1000 ML: 0.9 INJECTION, SOLUTION INTRAVENOUS at 16:21

## 2022-08-09 ENCOUNTER — OCCMED (OUTPATIENT)
Dept: URGENT CARE | Facility: CLINIC | Age: 62
End: 2022-08-09
Payer: OTHER MISCELLANEOUS

## 2022-08-09 DIAGNOSIS — M62.81 MUSCLE WEAKNESS: ICD-10-CM

## 2022-08-09 DIAGNOSIS — E86.0 DEHYDRATION: Primary | ICD-10-CM

## 2022-08-09 LAB
ATRIAL RATE: 73 BPM
P AXIS: 78 DEGREES
PR INTERVAL: 140 MS
QRS AXIS: 81 DEGREES
QRSD INTERVAL: 86 MS
QT INTERVAL: 392 MS
QTC INTERVAL: 431 MS
T WAVE AXIS: 67 DEGREES
VENTRICULAR RATE: 73 BPM

## 2022-08-09 PROCEDURE — 93010 ELECTROCARDIOGRAM REPORT: CPT | Performed by: INTERNAL MEDICINE

## 2022-08-09 PROCEDURE — 99213 OFFICE O/P EST LOW 20 MIN: CPT | Performed by: NURSE PRACTITIONER

## 2022-08-09 NOTE — ED PROVIDER NOTES
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  Taking?   aluminum-magnesium hydroxide-simethicone (MYLANTA) 200-200-20 mg/5 mL suspension   No No   Sig: Take 30 mL by mouth every 2 (two) hours as needed for indigestion or heartburn   Patient not taking: Reported on 5/25/2022   aspirin (ECOTRIN LOW STRENGTH) 81 mg EC tablet   No No   Sig: Take 1 tablet (81 mg total) by mouth daily   Patient not taking: Reported on 5/25/2022   atorvastatin (LIPITOR) 40 mg tablet   No No   Sig: Take 1 tablet (40 mg total) by mouth daily with dinner   Patient not taking: Reported on 5/25/2022   metoprolol succinate (TOPROL-XL) 25 mg 24 hr tablet   No No   Sig: Take 1 tablet (25 mg total) by mouth daily   Patient not taking: Reported on 5/25/2022   nicotine (NICODERM CQ) 14 mg/24hr TD 24 hr patch   No No   Sig: Place 1 patch on the skin every 24 hours   Patient not taking: Reported on 5/25/2022   nicotine polacrilex (COMMIT) 4 MG lozenge   No No   Sig: Apply 1 lozenge (4 mg total) to the mouth or throat as needed for smoking cessation   Patient not taking: Reported on 5/25/2022   nicotine polacrilex (NICORETTE) 4 mg gum   No No   Sig: Chew 1 each (4 mg total) as needed for smoking cessation   Patient not taking: Reported on 5/25/2022   omeprazole (PriLOSEC) 20 mg delayed release capsule   Yes No   Sig: Take 20 mg by mouth daily      Facility-Administered Medications: None       History reviewed  No pertinent past medical history  Past Surgical History:   Procedure Laterality Date    HERNIA REPAIR         History reviewed  No pertinent family history  I have reviewed and agree with the history as documented  E-Cigarette/Vaping    E-Cigarette Use Never User      E-Cigarette/Vaping Substances    Nicotine No     THC No     CBD No     Flavoring No     Other No     Unknown No      Social History     Tobacco Use    Smoking status: Current Every Day Smoker     Packs/day: 0 50    Smokeless tobacco: Never Used   Vaping Use    Vaping Use: Never used       Review of Systems   Constitutional: Negative for fever  HENT: Negative for sore throat  Eyes: Negative for photophobia  Respiratory: Negative for shortness of breath  Cardiovascular: Negative for chest pain  Gastrointestinal: Negative for abdominal pain  Genitourinary: Negative for dysuria  Musculoskeletal: Negative for back pain  Skin: Negative for rash  Neurological: Negative for light-headedness  Hematological: Negative for adenopathy  Psychiatric/Behavioral: Negative for agitation  All other systems reviewed and are negative  Physical Exam  Physical Exam  Vitals reviewed  Constitutional:       General: He is not in acute distress  Appearance: He is well-developed  HENT:      Head: Normocephalic  Eyes:      Pupils: Pupils are equal, round, and reactive to light  Cardiovascular:      Rate and Rhythm: Normal rate and regular rhythm  Heart sounds: Normal heart sounds  No murmur heard  No friction rub  No gallop  Pulmonary:      Effort: Pulmonary effort is normal       Breath sounds: Normal breath sounds  Abdominal:      General: Bowel sounds are normal  There is no distension  Palpations: Abdomen is soft  Tenderness: There is no abdominal tenderness  There is no guarding  Musculoskeletal:         General: Normal range of motion  Cervical back: Normal range of motion and neck supple  Comments: No lower extremity swelling or tenderness on exam   Skin:     Capillary Refill: Capillary refill takes less than 2 seconds  Neurological:      Mental Status: He is alert and oriented to person, place, and time  Cranial Nerves: No cranial nerve deficit  Sensory: No sensory deficit  Motor: No abnormal muscle tone  Psychiatric:         Behavior: Behavior normal          Thought Content:  Thought content normal          Judgment: Judgment normal          Vital Signs  ED Triage Vitals [08/08/22 1427]   Temperature Pulse Respirations Blood Pressure SpO2   (!) 97 4 °F (36 3 °C) 82 17 161/92 100 %      Temp Source Heart Rate Source Patient Position - Orthostatic VS BP Location FiO2 (%)   Tympanic -- -- Right arm --      Pain Score       --           Vitals:    08/08/22 1427   BP: 161/92   Pulse: 82         Visual Acuity      ED Medications  Medications   sodium chloride 0 9 % bolus 1,000 mL (0 mL Intravenous Stopped 8/8/22 1701)       Diagnostic Studies  Results Reviewed     Procedure Component Value Units Date/Time    CK (with reflex to MB) [[id number]]  (Normal) Collected: 08/08/22 1619    Lab Status: Final result Specimen: Blood from Arm, Right Updated: 08/08/22 1646     Total  U/L     Comprehensive metabolic panel [[id number]]  (Abnormal) Collected: 08/08/22 1619    Lab Status: Final result Specimen: Blood from Arm, Right Updated: 08/08/22 1646     Sodium 134 mmol/L      Potassium 4 1 mmol/L      Chloride 98 mmol/L      CO2 28 mmol/L      ANION GAP 8 mmol/L      BUN 6 mg/dL      Creatinine 0 78 mg/dL      Glucose 104 mg/dL      Calcium 9 3 mg/dL      AST 21 U/L      ALT 13 U/L      Alkaline Phosphatase 59 U/L      Total Protein 7 5 g/dL      Albumin 4 4 g/dL      Total Bilirubin 0 67 mg/dL      eGFR 97 ml/min/1 73sq m     Narrative:      National Kidney Disease Foundation guidelines for Chronic Kidney Disease (CKD):     Stage 1 with normal or high GFR (GFR > 90 mL/min/1 73 square meters)    Stage 2 Mild CKD (GFR = 60-89 mL/min/1 73 square meters)    Stage 3A Moderate CKD (GFR = 45-59 mL/min/1 73 square meters)    Stage 3B Moderate CKD (GFR = 30-44 mL/min/1 73 square meters)    Stage 4 Severe CKD (GFR = 15-29 mL/min/1 73 square meters)    Stage 5 End Stage CKD (GFR <15 mL/min/1 73 square meters)  Note: GFR calculation is accurate only with a steady state creatinine    CBC and differential [[id number]]  (Abnormal) Collected: 08/08/22 1619    Lab Status: Final result Specimen: Blood from Arm, Right Updated: 08/08/22 1631     WBC 13 13 Thousand/uL      RBC 4 28 Million/uL      Hemoglobin 14 5 g/dL      Hematocrit 41 3 %      MCV 97 fL      MCH 33 9 pg      MCHC 35 1 g/dL      RDW 12 9 %      MPV 9 4 fL      Platelets 637 Thousands/uL      nRBC 0 /100 WBCs      Neutrophils Relative 71 %      Immat GRANS % 0 %      Lymphocytes Relative 21 %      Monocytes Relative 7 %      Eosinophils Relative 1 %      Basophils Relative 0 %      Neutrophils Absolute 9 24 Thousands/µL      Immature Grans Absolute 0 04 Thousand/uL      Lymphocytes Absolute 2 78 Thousands/µL      Monocytes Absolute 0 87 Thousand/µL      Eosinophils Absolute 0 15 Thousand/µL      Basophils Absolute 0 05 Thousands/µL                  No orders to display              Procedures  Procedures         ED Course                                             MDM  Number of Diagnoses or Management Options  Dehydration  Diagnosis management comments: Patient is a 55-year-old male presents for evaluation of dehydration  Blood work unremarkable    In 1 L IV fluid asymptomatic throughout his time here in the emergency room  Advised on better hydration and strict return precautions  Disposition  Final diagnoses:   Dehydration     Time reflects when diagnosis was documented in both MDM as applicable and the Disposition within this note     Time User Action Codes Description Comment    8/8/2022  4:53 PM Mendel Lowe Add [E86 0] Dehydration       ED Disposition     ED Disposition   Discharge    Condition   Stable    Date/Time   Mon Aug 8, 2022  4:53 PM    Comment   Cecil Hall discharge to home/self care                 Follow-up Information     Follow up With Specialties Details Why [address] Emergency Department Emergency Medicine  If symptoms worsen [address] [place] [address] [phone] [facility] Emergency Department, [address], [place], [address]          Discharge Medication List as of 8/8/2022  4:53 PM      CONTINUE these medications which have NOT CHANGED    Details   aluminum-magnesium hydroxide-simethicone (MYLANTA) 200-200-20 mg/5 mL suspension Take 30 mL by mouth every 2 (two) hours as needed for indigestion or heartburn, Starting Wed 11/25/2020, No Print      aspirin (ECOTRIN LOW STRENGTH) 81 mg EC tablet Take 1 tablet (81 mg total) by mouth daily, Starting Thu 11/26/2020, No Print      atorvastatin (LIPITOR) 40 mg tablet Take 1 tablet (40 mg total) by mouth daily with dinner, Starting Tue 12/1/2020, Normal      metoprolol succinate (TOPROL-XL) 25 mg 24 hr tablet Take 1 tablet (25 mg total) by mouth daily, Starting Wed 11/25/2020, Print      nicotine (NICODERM CQ) 14 mg/24hr TD 24 hr patch Place 1 patch on the skin every 24 hours, Starting Wed 12/16/2020, Normal      nicotine polacrilex (COMMIT) 4 MG lozenge Apply 1 lozenge (4 mg total) to the mouth or throat as needed for smoking cessation, Starting Wed 12/16/2020, Normal      nicotine polacrilex (NICORETTE) 4 mg gum Chew 1 each (4 mg total) as needed for smoking cessation, Starting Wed 12/16/2020, Normal      omeprazole (PriLOSEC) 20 mg delayed release capsule Take 20 mg by mouth daily, Historical Med             No discharge procedures on file      PDMP Review     None          ED Provider  Electronically Signed by           Mely Montgomery MD  08/08/22 2026

## 2023-09-08 ENCOUNTER — HOSPITAL ENCOUNTER (EMERGENCY)
Facility: HOSPITAL | Age: 63
Discharge: HOME | End: 2023-09-08
Attending: EMERGENCY MEDICINE
Payer: OTHER MISCELLANEOUS

## 2023-09-08 ENCOUNTER — APPOINTMENT (EMERGENCY)
Dept: RADIOLOGY | Facility: HOSPITAL | Age: 63
End: 2023-09-08
Payer: OTHER MISCELLANEOUS

## 2023-09-08 VITALS
HEIGHT: 72 IN | WEIGHT: 210 LBS | SYSTOLIC BLOOD PRESSURE: 153 MMHG | BODY MASS INDEX: 28.44 KG/M2 | TEMPERATURE: 97.5 F | RESPIRATION RATE: 20 BRPM | OXYGEN SATURATION: 96 % | DIASTOLIC BLOOD PRESSURE: 76 MMHG | HEART RATE: 85 BPM

## 2023-09-08 DIAGNOSIS — S83.91XA RIGHT KNEE SPRAIN: ICD-10-CM
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
## 2023-09-08 DIAGNOSIS — T07.XXXA ABRASIONS OF MULTIPLE SITES: ICD-10-CM

## 2023-09-08 PROCEDURE — 99284 EMERGENCY DEPT VISIT MOD MDM: CPT | Performed by: EMERGENCY MEDICINE

## 2023-09-08 PROCEDURE — 90715 TDAP VACCINE 7 YRS/> IM: CPT

## 2023-09-08 PROCEDURE — 73564 X-RAY EXAM KNEE 4 OR MORE: CPT

## 2023-09-08 PROCEDURE — 96372 THER/PROPH/DIAG INJ SC/IM: CPT

## 2023-09-08 PROCEDURE — 99284 EMERGENCY DEPT VISIT MOD MDM: CPT

## 2023-09-08 RX ORDER — NAPROXEN 500 MG/1
500 TABLET ORAL 2 TIMES DAILY WITH MEALS
Qty: 30 TABLET | Refills: 0 | Status: SHIPPED | OUTPATIENT
Start: 2023-09-08

## 2023-09-08 RX ORDER — KETOROLAC TROMETHAMINE 30 MG/ML
15 INJECTION, SOLUTION INTRAMUSCULAR; INTRAVENOUS ONCE
Status: COMPLETED | OUTPATIENT
Start: 2023-09-08 | End: 2023-09-08

## 2023-09-08 RX ADMIN — TETANUS TOXOID, REDUCED DIPHTHERIA TOXOID AND ACELLULAR PERTUSSIS VACCINE, ADSORBED 0.5 ML: 5; 2.5; 8; 8; 2.5 SUSPENSION INTRAMUSCULAR at 12:43

## 2023-09-08 RX ADMIN — KETOROLAC TROMETHAMINE 15 MG: 30 INJECTION, SOLUTION INTRAMUSCULAR; INTRAVENOUS at 12:43

## 2023-09-08 NOTE — DISCHARGE INSTRUCTIONS
Follow-up with occupational health. Please return to the ED if your pain worsens or you develop weakness, otherwise see your pcp for follow-up additionally. Take naproxen for pain. Clean wounds with soap and water.

## 2023-09-08 NOTE — ED TRIAGE NOTES
Patient reports to this ED c/o he fell into a steel hole at work, no LOC, bruising on left back and left bicep, pt unsure of last tetanus vax    Pt AAOx4

## 2023-09-08 NOTE — ED ATTENDING ATTESTATION
9/8/2023  IRebecca DO, saw and evaluated the patient. I have discussed the patient with the resident/non-physician practitioner and agree with the resident's/non-physician practitioner's findings, Plan of Care, and MDM as documented in the resident's/non-physician practitioner's note, except where noted. All available labs and Radiology studies were reviewed. I was present for key portions of any procedure(s) performed by the resident/non-physician practitioner and I was immediately available to provide assistance. At this point I agree with the current assessment done in the Emergency Department. I have conducted an independent evaluation of this patient a history and physical is as follows:    25-year-old male presents for evaluation. Patient reports he was at work when he fell into a ditch like structure. Patient reports hitting his left arm and chest along the inside of the ditch was about 4 feet deep. He believes his right knee and leg were caught outside as he fell and twisted his knee. Patient complains most of the right knee pain at this time. He is ambulatory. He denies head strike or loss of consciousness, neck or back pain, chest pain or dyspnea, abdominal pain. Does not recall when his last tetanus immunization was. Will obtain x-rays of extremities to rule out occult injury, update tetanus. This time doubt intrathoracic or intra-abdominal injury given mechanism of sliding against the wall. Physical Exam  Vitals reviewed. Constitutional:       General: He is not in acute distress. Appearance: Normal appearance. He is not ill-appearing, toxic-appearing or diaphoretic. HENT:      Head: Normocephalic and atraumatic. Right Ear: External ear normal.      Left Ear: External ear normal.      Nose: Nose normal.      Mouth/Throat:      Mouth: Mucous membranes are moist.   Eyes:      General:         Right eye: No discharge. Left eye: No discharge. Extraocular Movements: Extraocular movements intact. Cardiovascular:      Rate and Rhythm: Normal rate. Pulmonary:      Effort: Pulmonary effort is normal. No respiratory distress. Abdominal:      General: There is no distension. Palpations: Abdomen is soft. Tenderness: There is no abdominal tenderness. There is no right CVA tenderness, left CVA tenderness, guarding or rebound. Musculoskeletal:         General: No deformity or signs of injury. Comments: Point tenderness to R medial knee capsule   Skin:     General: Skin is warm. Findings: Erythema present. Comments: Superficial abrasions to LUE, L posterior-lateral chest wall   Neurological:      General: No focal deficit present. Mental Status: He is alert. Mental status is at baseline.             ED Course         Critical Care Time  Procedures

## 2023-09-08 NOTE — ED PROVIDER NOTES
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 Pt landed on left arm, left back with abrasions. Pt also twisted right knee possibly as he was falling or upon landing - he has been able to walk up and down stairs since accident. Abrasion to LUE was cleaned and bandaged at site - was told to come to ED. Pt has full ROM of all 4 extremities, moderate pain with extension of right knee. States that he did nt hit his head, nor his neck. His neck has been mildly stiff for the past few days but unrelated to trauma, chronic. Unsure of last Tdap. No dyspnea, chest pain, weakness, neuro deficits. Prior to Admission Medications   Prescriptions Last Dose Informant Patient Reported?  Taking?   aluminum-magnesium hydroxide-simethicone (MYLANTA) 200-200-20 mg/5 mL suspension   No No   Sig: Take 30 mL by mouth every 2 (two) hours as needed for indigestion or heartburn   Patient not taking: Reported on 5/25/2022   aspirin (ECOTRIN LOW STRENGTH) 81 mg EC tablet   No No   Sig: Take 1 tablet (81 mg total) by mouth daily   Patient not taking: Reported on 5/25/2022   atorvastatin (LIPITOR) 40 mg tablet   No No   Sig: Take 1 tablet (40 mg total) by mouth daily with dinner   Patient not taking: Reported on 5/25/2022   metoprolol succinate (TOPROL-XL) 25 mg 24 hr tablet   No No   Sig: Take 1 tablet (25 mg total) by mouth daily   Patient not taking: Reported on 5/25/2022   nicotine (NICODERM CQ) 14 mg/24hr TD 24 hr patch   No No   Sig: Place 1 patch on the skin every 24 hours   Patient not taking: Reported on 5/25/2022   nicotine polacrilex (COMMIT) 4 MG lozenge   No No   Sig: Apply 1 lozenge (4 mg total) to the mouth or throat as needed for smoking cessation   Patient not taking: Reported on 5/25/2022   nicotine polacrilex (NICORETTE) 4 mg gum   No No   Sig: Chew 1 each (4 mg total) as needed for smoking cessation   Patient not taking: Reported on 5/25/2022   omeprazole (PriLOSEC) 20 mg delayed release capsule   Yes No   Sig: Take 20 mg by mouth daily      Facility-Administered Medications: None       No past medical history on file. Past Surgical History:   Procedure Laterality Date   • HERNIA REPAIR         No family history on file. I have reviewed and agree with the history as documented. E-Cigarette/Vaping   • E-Cigarette Use Never User      E-Cigarette/Vaping Substances   • Nicotine No    • THC No    • CBD No    • Flavoring No    • Other No    • Unknown No      Social History     Tobacco Use   • Smoking status: Every Day     Packs/day: 0.50     Types: Cigarettes   • Smokeless tobacco: Never   Vaping Use   • Vaping Use: Never used        Review of Systems   Musculoskeletal: Positive for arthralgias and neck stiffness (unrelated to fall, stiff for past few days. ). Skin: Positive for wound (several abrasions from fall). All other systems reviewed and are negative. Physical Exam  ED Triage Vitals [09/08/23 1210]   Temperature Pulse Respirations Blood Pressure SpO2   97.5 °F (36.4 °C) 98 20 169/98 96 %      Temp Source Heart Rate Source Patient Position - Orthostatic VS BP Location FiO2 (%)   Temporal Monitor Sitting Right arm --      Pain Score       3             Orthostatic Vital Signs  Vitals:    09/08/23 1210 09/08/23 1300   BP: 169/98 153/76   Pulse: 98 85   Patient Position - Orthostatic VS: Sitting        Physical Exam  Vitals and nursing note reviewed. Constitutional:       General: He is not in acute distress. Appearance: Normal appearance. He is not ill-appearing, toxic-appearing or diaphoretic. HENT:      Head: Normocephalic and atraumatic.       Right Ear: Tympanic membrane normal.      Left Ear: Tympanic membrane normal.      Nose: Nose normal.      Mouth/Throat: Mouth: Mucous membranes are moist.      Pharynx: Oropharynx is clear. Eyes:      General: No scleral icterus. Right eye: No discharge. Left eye: No discharge. Extraocular Movements: Extraocular movements intact. Conjunctiva/sclera: Conjunctivae normal.      Pupils: Pupils are equal, round, and reactive to light. Cardiovascular:      Rate and Rhythm: Normal rate and regular rhythm. Pulses: Normal pulses. Heart sounds: Normal heart sounds. Pulmonary:      Effort: Pulmonary effort is normal. No respiratory distress. Breath sounds: Normal breath sounds. No stridor. No wheezing, rhonchi or rales. Chest:      Chest wall: No tenderness. Abdominal:      General: Abdomen is flat. There is no distension. Palpations: Abdomen is soft. Tenderness: There is no abdominal tenderness. There is no right CVA tenderness, left CVA tenderness, guarding or rebound. Musculoskeletal:         General: Tenderness (over MCL on RLE) present. No deformity. Cervical back: Normal range of motion and neck supple. No rigidity or tenderness. Right lower leg: No edema. Left lower leg: No edema. Comments: Full ROM, mild pain with right knee extension. Skin:     General: Skin is warm. Coloration: Skin is not jaundiced or pale. Findings: Erythema (abrasions to LUE at lateral elbow and proximally) present. No bruising or rash. Neurological:      General: No focal deficit present. Mental Status: He is alert and oriented to person, place, and time. Cranial Nerves: No cranial nerve deficit. Sensory: No sensory deficit. Motor: No weakness.       Coordination: Coordination normal.   Psychiatric:         Mood and Affect: Mood normal.         Behavior: Behavior normal.         ED Medications  Medications   tetanus-diphtheria-acellular pertussis (BOOSTRIX) IM injection 0.5 mL (0.5 mL Intramuscular Given 9/8/23 1243)   ketorolac (TORADOL) injection 15 mg (15 mg Intramuscular Given 9/8/23 1243)       Diagnostic Studies  Results Reviewed     None                 XR knee 4+ views Right injury   ED Interpretation by Rupert Vicente DO (09/08 1249)   No acute osseous abnormality. Final Result by Loly Alvarado MD (09/08 1317)      No acute osseous abnormality. Workstation performed: [id number]               Procedures  Procedures      ED Course     No head injury, Tdap and toradol administered - pain is improved. Xray of right knee without acute osseous abnormality. Pt able to ambulate but right knee is stiff and would prefer crutches/knee immobilizer. Work note provided. Advised f/u with local urgent care as the injury occurred at work. Discussed return precautions. Hemodynamically stable, no acute distress, able to ambulate well with immobilizer + crutches. He is agreeable to plan and will otherwise f/u with his pcp. SBIRT 20yo+    Flowsheet Row Most Recent Value   Initial Alcohol Screen: US AUDIT-C     1. How often do you have a drink containing alcohol? 0 Filed at: 09/08/2023 1232   2. How many drinks containing alcohol do you have on a typical day you are drinking? 0 Filed at: 09/08/2023 1232   3a. Male UNDER 65: How often do you have five or more drinks on one occasion? 0 Filed at: 09/08/2023 1232   3b. FEMALE Any Age, or MALE 65+: How often do you have 4 or more drinks on one occassion? 0 Filed at: 09/08/2023 1232   Audit-C Score 0 Filed at: 09/08/2023 1232   FRANCISCO: How many times in the past year have you. .. Used an illegal drug or used a prescription medication for non-medical reasons? 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 Pt landed on left arm, left back with abrasions.  Pt also twisted right knee possibly as he was falling or upon landing - he has been able to walk up and down stairs since accident. Abrasion to DIAMANTEE was cleaned and bandaged at site - was told to come to ED. Pt has full ROM of all 4 extremities, moderate pain with extension of right knee. States that he did nt hit his head, nor his neck. His neck has been mildly stiff for the past few days but unrelated to trauma, chronic. Unsure of last Tdap. No dyspnea, chest pain, weakness, neuro deficits. No head injury, Tdap and toradol administered - pain is improved. Xray of right knee without acute osseous abnormality. Pt able to ambulate but right knee is stiff and would prefer crutches/knee immobilizer. Work note provided. Advised f/u with local urgent care as the injury occurred at work. Discussed return precautions. Hemodynamically stable, no acute distress, able to ambulate well with immobilizer + crutches. He is agreeable to plan and will otherwise f/u with his pcp. Amount and/or Complexity of Data Reviewed  Radiology: ordered and independent interpretation performed. Risk  Prescription drug management. Fall, sprain, strain, fracture, bone contusion, abrasion    Disposition  Final diagnoses:   Fall, initial encounter   Right knee sprain   Abrasions of multiple sites - left arm, left back     Time reflects when diagnosis was documented in both MDM as applicable and the Disposition within this note     Time User Action Codes Description Comment    9/8/2023 12:27 PM Roxianne Counter Add Jessenia. NQIO] Fall, initial encounter     9/8/2023 12:27 PM Roxianne Counter Add [U19.09NN] Right knee sprain     9/8/2023 12:27 PM Roxianne Counter Add [T07. XXXA] Abrasions of multiple sites     9/8/2023 12:27 PM Roxianne Counter Modify [T07. XXXA] Abrasions of multiple sites left arm, left back      ED Disposition     ED Disposition   Discharge    Condition   Stable    Date/Time   Fri Sep 8, 2023 12:50 PM    Comment   Cecil Hall discharge to home/self care.                Follow-up Information     Follow up With Specialties Details Why Contact Info Additional Information    [facility] Emergency Department Emergency Medicine  As needed [address] [facility] [address]  Emergency Department, [address], [place], [address] [place] [id number]  [phone]     Barbara Taylor, [address] [facility]  [phone]             Discharge Medication List as of 9/8/2023 12:51 PM      START taking these medications    Details   naproxen (Naprosyn) 500 mg tablet Take 1 tablet (500 mg total) by mouth 2 (two) times a day with meals, Starting Fri 9/8/2023, Normal         CONTINUE these medications which have NOT CHANGED    Details   aluminum-magnesium hydroxide-simethicone (MYLANTA) 200-200-20 mg/5 mL suspension Take 30 mL by mouth every 2 (two) hours as needed for indigestion or heartburn, Starting Wed 11/25/2020, No Print      aspirin (ECOTRIN LOW STRENGTH) 81 mg EC tablet Take 1 tablet (81 mg total) by mouth daily, Starting Thu 11/26/2020, No Print      atorvastatin (LIPITOR) 40 mg tablet Take 1 tablet (40 mg total) by mouth daily with dinner, Starting Tue 12/1/2020, Normal      metoprolol succinate (TOPROL-XL) 25 mg 24 hr tablet Take 1 tablet (25 mg total) by mouth daily, Starting Wed 11/25/2020, Print      nicotine (NICODERM CQ) 14 mg/24hr TD 24 hr patch Place 1 patch on the skin every 24 hours, Starting Wed 12/16/2020, Normal      nicotine polacrilex (COMMIT) 4 MG lozenge Apply 1 lozenge (4 mg total) to the mouth or throat as needed for smoking cessation, Starting Wed 12/16/2020, Normal      nicotine polacrilex (NICORETTE) 4 mg gum Chew 1 each (4 mg total) as needed for smoking cessation, Starting Wed 12/16/2020, Normal      omeprazole (PriLOSEC) 20 mg delayed release capsule Take 20 mg by mouth daily, Historical Med           No discharge procedures on file. PDMP Review     None           ED Provider  Attending physically available and evaluated [address]. I managed the patient along with the ED Attending.     Electronically Signed by         Kashif Mistry DO  09/10/23 3093

## 2023-09-08 NOTE — Clinical Note
Lavelle Rdz was seen and treated in our emergency department on 9/8/2023. Diagnosis:     Eílas Pereira  may return to work on return date. He may return on this date: 09/10/2023    Elías Pereira was seen in the 's ED after fall at work, he may return to work as tolerated on 9/10 with light duty until cleared by orthopedics. If you have any questions or concerns, please don't hesitate to call.       aYn Plunkett, DO    ______________________________           _______________          _______________  Hospital Representative                              Date                                Time

## 2024-02-27 DIAGNOSIS — J11.1 INFLUENZA: Primary | ICD-10-CM

## 2024-02-27 RX ORDER — OSELTAMIVIR PHOSPHATE 75 MG/1
75 CAPSULE ORAL EVERY 12 HOURS SCHEDULED
Qty: 10 CAPSULE | Refills: 0 | Status: SHIPPED | OUTPATIENT
Start: 2024-02-27 | End: 2024-03-03

## 2024-05-23 ENCOUNTER — APPOINTMENT (EMERGENCY)
Dept: CT IMAGING | Facility: HOSPITAL | Age: 64
End: 2024-05-23
Payer: COMMERCIAL

## 2024-05-23 ENCOUNTER — DOCUMENTATION (OUTPATIENT)
Dept: CARDIOLOGY CLINIC | Facility: CLINIC | Age: 64
End: 2024-05-23

## 2024-05-23 ENCOUNTER — APPOINTMENT (EMERGENCY)
Dept: RADIOLOGY | Facility: HOSPITAL | Age: 64
End: 2024-05-23
Payer: COMMERCIAL

## 2024-05-23 ENCOUNTER — HOSPITAL ENCOUNTER (INPATIENT)
Facility: HOSPITAL | Age: 64
LOS: 2 days | Discharge: HOME | DRG: 282 | End: 2024-05-25
Attending: HOSPITALIST | Admitting: HOSPITALIST
Payer: COMMERCIAL

## 2024-05-23 ENCOUNTER — HOSPITAL ENCOUNTER (EMERGENCY)
Facility: HOSPITAL | Age: 64
End: 2024-05-23
Attending: EMERGENCY MEDICINE
Payer: COMMERCIAL

## 2024-05-23 VITALS
SYSTOLIC BLOOD PRESSURE: 102 MMHG | HEART RATE: 63 BPM | BODY MASS INDEX: 27.18 KG/M2 | WEIGHT: 200.4 LBS | RESPIRATION RATE: 22 BRPM | TEMPERATURE: 97.6 F | DIASTOLIC BLOOD PRESSURE: 58 MMHG | OXYGEN SATURATION: 94 %

## 2024-05-23 DIAGNOSIS — K21.9 GASTROESOPHAGEAL REFLUX DISEASE WITHOUT ESOPHAGITIS: Chronic | ICD-10-CM

## 2024-05-23 DIAGNOSIS — R07.9 CHEST PAIN: ICD-10-CM

## 2024-05-23 DIAGNOSIS — I21.3 STEMI (ST ELEVATION MYOCARDIAL INFARCTION) (HCC): Primary | ICD-10-CM

## 2024-05-23 DIAGNOSIS — I21.4 NSTEMI (NON-ST ELEVATED MYOCARDIAL INFARCTION) (HCC): Primary | ICD-10-CM

## 2024-05-23 DIAGNOSIS — Z72.0 TOBACCO ABUSE: Chronic | ICD-10-CM

## 2024-05-23 LAB
2HR DELTA HS TROPONIN: 109 NG/L
4HR DELTA HS TROPONIN: 427 NG/L
ALBUMIN SERPL BCP-MCNC: 4.4 G/DL (ref 3.5–5)
ALP SERPL-CCNC: 59 U/L (ref 34–104)
ALT SERPL W P-5'-P-CCNC: 12 U/L (ref 7–52)
ANION GAP SERPL CALCULATED.3IONS-SCNC: 15 MMOL/L (ref 4–13)
ANION GAP SERPL CALCULATED.3IONS-SCNC: 9 MMOL/L (ref 4–13)
APTT PPP: 25 SECONDS (ref 23–37)
APTT PPP: 43 SECONDS (ref 23–37)
AST SERPL W P-5'-P-CCNC: 18 U/L (ref 13–39)
BASOPHILS # BLD AUTO: 0.06 THOUSANDS/ÂΜL (ref 0–0.1)
BASOPHILS NFR BLD AUTO: 1 % (ref 0–1)
BILIRUB SERPL-MCNC: 0.62 MG/DL (ref 0.2–1)
BNP SERPL-MCNC: 17 PG/ML (ref 0–100)
BUN SERPL-MCNC: 11 MG/DL (ref 5–25)
BUN SERPL-MCNC: 9 MG/DL (ref 5–25)
CA-I BLD-SCNC: 1.1 MMOL/L (ref 1.12–1.32)
CALCIUM SERPL-MCNC: 9.2 MG/DL (ref 8.4–10.2)
CALCIUM SERPL-MCNC: 9.9 MG/DL (ref 8.4–10.2)
CARDIAC TROPONIN I PNL SERPL HS: 115 NG/L
CARDIAC TROPONIN I PNL SERPL HS: 343 NG/L
CARDIAC TROPONIN I PNL SERPL HS: 433 NG/L
CARDIAC TROPONIN I PNL SERPL HS: 6 NG/L
CHLORIDE SERPL-SCNC: 101 MMOL/L (ref 96–108)
CHLORIDE SERPL-SCNC: 98 MMOL/L (ref 96–108)
CO2 SERPL-SCNC: 20 MMOL/L (ref 21–32)
CO2 SERPL-SCNC: 24 MMOL/L (ref 21–32)
CREAT SERPL-MCNC: 0.89 MG/DL (ref 0.6–1.3)
CREAT SERPL-MCNC: 1.12 MG/DL (ref 0.6–1.3)
D DIMER PPP FEU-MCNC: 0.6 UG/ML FEU
EOSINOPHIL # BLD AUTO: 0.35 THOUSAND/ÂΜL (ref 0–0.61)
EOSINOPHIL NFR BLD AUTO: 3 % (ref 0–6)
ERYTHROCYTE [DISTWIDTH] IN BLOOD BY AUTOMATED COUNT: 12.1 % (ref 11.6–15.1)
ERYTHROCYTE [DISTWIDTH] IN BLOOD BY AUTOMATED COUNT: 12.5 % (ref 11.6–15.1)
EST. AVERAGE GLUCOSE BLD GHB EST-MCNC: 111 MG/DL
GFR SERPL CREATININE-BSD FRML MDRD: 70 ML/MIN/1.73SQ M
GFR SERPL CREATININE-BSD FRML MDRD: 91 ML/MIN/1.73SQ M
GLUCOSE SERPL-MCNC: 119 MG/DL (ref 65–140)
GLUCOSE SERPL-MCNC: 138 MG/DL (ref 65–140)
HBA1C MFR BLD: 5.5 %
HCT VFR BLD AUTO: 42.3 % (ref 36.5–49.3)
HCT VFR BLD AUTO: 42.7 % (ref 36.5–49.3)
HGB BLD-MCNC: 14.6 G/DL (ref 12–17)
HGB BLD-MCNC: 14.9 G/DL (ref 12–17)
IMM GRANULOCYTES # BLD AUTO: 0.05 THOUSAND/UL (ref 0–0.2)
IMM GRANULOCYTES NFR BLD AUTO: 0 % (ref 0–2)
INR PPP: 0.92 (ref 0.84–1.19)
INR PPP: 0.98 (ref 0.84–1.19)
LIPASE SERPL-CCNC: 24 U/L (ref 11–82)
LYMPHOCYTES # BLD AUTO: 5.58 THOUSANDS/ÂΜL (ref 0.6–4.47)
LYMPHOCYTES NFR BLD AUTO: 49 % (ref 14–44)
MAGNESIUM SERPL-MCNC: 1.9 MG/DL (ref 1.9–2.7)
MAGNESIUM SERPL-MCNC: 2.2 MG/DL (ref 1.9–2.7)
MCH RBC QN AUTO: 32.5 PG (ref 26.8–34.3)
MCH RBC QN AUTO: 33 PG (ref 26.8–34.3)
MCHC RBC AUTO-ENTMCNC: 34.5 G/DL (ref 31.4–37.4)
MCHC RBC AUTO-ENTMCNC: 34.9 G/DL (ref 31.4–37.4)
MCV RBC AUTO: 93 FL (ref 82–98)
MCV RBC AUTO: 96 FL (ref 82–98)
MONOCYTES # BLD AUTO: 1.19 THOUSAND/ÂΜL (ref 0.17–1.22)
MONOCYTES NFR BLD AUTO: 11 % (ref 4–12)
NEUTROPHILS # BLD AUTO: 4.03 THOUSANDS/ÂΜL (ref 1.85–7.62)
NEUTS SEG NFR BLD AUTO: 36 % (ref 43–75)
NRBC BLD AUTO-RTO: 0 /100 WBCS
PHOSPHATE SERPL-MCNC: 2.2 MG/DL (ref 2.3–4.1)
PLATELET # BLD AUTO: 232 THOUSANDS/UL (ref 149–390)
PLATELET # BLD AUTO: 245 THOUSANDS/UL (ref 149–390)
PMV BLD AUTO: 10.1 FL (ref 8.9–12.7)
PMV BLD AUTO: 9.8 FL (ref 8.9–12.7)
POTASSIUM SERPL-SCNC: 3.4 MMOL/L (ref 3.5–5.3)
POTASSIUM SERPL-SCNC: 4.5 MMOL/L (ref 3.5–5.3)
PROT SERPL-MCNC: 7.7 G/DL (ref 6.4–8.4)
PROTHROMBIN TIME: 12.3 SECONDS (ref 11.6–14.5)
PROTHROMBIN TIME: 12.9 SECONDS (ref 11.6–14.5)
RBC # BLD AUTO: 4.42 MILLION/UL (ref 3.88–5.62)
RBC # BLD AUTO: 4.59 MILLION/UL (ref 3.88–5.62)
SODIUM SERPL-SCNC: 133 MMOL/L (ref 135–147)
SODIUM SERPL-SCNC: 134 MMOL/L (ref 135–147)
WBC # BLD AUTO: 11.26 THOUSAND/UL (ref 4.31–10.16)
WBC # BLD AUTO: 12.85 THOUSAND/UL (ref 4.31–10.16)

## 2024-05-23 PROCEDURE — 84484 ASSAY OF TROPONIN QUANT: CPT | Performed by: EMERGENCY MEDICINE

## 2024-05-23 PROCEDURE — 80048 BASIC METABOLIC PNL TOTAL CA: CPT

## 2024-05-23 PROCEDURE — 84100 ASSAY OF PHOSPHORUS: CPT

## 2024-05-23 PROCEDURE — 96366 THER/PROPH/DIAG IV INF ADDON: CPT

## 2024-05-23 PROCEDURE — 74174 CTA ABD&PLVS W/CONTRAST: CPT

## 2024-05-23 PROCEDURE — 83735 ASSAY OF MAGNESIUM: CPT

## 2024-05-23 PROCEDURE — 99285 EMERGENCY DEPT VISIT HI MDM: CPT

## 2024-05-23 PROCEDURE — 93005 ELECTROCARDIOGRAM TRACING: CPT

## 2024-05-23 PROCEDURE — 83036 HEMOGLOBIN GLYCOSYLATED A1C: CPT

## 2024-05-23 PROCEDURE — NC001 PR NO CHARGE: Performed by: HOSPITALIST

## 2024-05-23 PROCEDURE — 99222 1ST HOSP IP/OBS MODERATE 55: CPT | Performed by: INTERNAL MEDICINE

## 2024-05-23 PROCEDURE — 84484 ASSAY OF TROPONIN QUANT: CPT

## 2024-05-23 PROCEDURE — 83735 ASSAY OF MAGNESIUM: CPT | Performed by: PHYSICIAN ASSISTANT

## 2024-05-23 PROCEDURE — 85730 THROMBOPLASTIN TIME PARTIAL: CPT

## 2024-05-23 PROCEDURE — 99291 CRITICAL CARE FIRST HOUR: CPT | Performed by: PHYSICIAN ASSISTANT

## 2024-05-23 PROCEDURE — 71275 CT ANGIOGRAPHY CHEST: CPT

## 2024-05-23 PROCEDURE — 80061 LIPID PANEL: CPT

## 2024-05-23 PROCEDURE — 71045 X-RAY EXAM CHEST 1 VIEW: CPT

## 2024-05-23 PROCEDURE — 36415 COLL VENOUS BLD VENIPUNCTURE: CPT

## 2024-05-23 PROCEDURE — 85025 COMPLETE CBC W/AUTO DIFF WBC: CPT | Performed by: EMERGENCY MEDICINE

## 2024-05-23 PROCEDURE — 85610 PROTHROMBIN TIME: CPT

## 2024-05-23 PROCEDURE — 85610 PROTHROMBIN TIME: CPT | Performed by: EMERGENCY MEDICINE

## 2024-05-23 PROCEDURE — 96365 THER/PROPH/DIAG IV INF INIT: CPT

## 2024-05-23 PROCEDURE — 82330 ASSAY OF CALCIUM: CPT

## 2024-05-23 PROCEDURE — 85730 THROMBOPLASTIN TIME PARTIAL: CPT | Performed by: EMERGENCY MEDICINE

## 2024-05-23 PROCEDURE — 80053 COMPREHEN METABOLIC PANEL: CPT | Performed by: EMERGENCY MEDICINE

## 2024-05-23 PROCEDURE — 96375 TX/PRO/DX INJ NEW DRUG ADDON: CPT

## 2024-05-23 PROCEDURE — 85379 FIBRIN DEGRADATION QUANT: CPT | Performed by: PHYSICIAN ASSISTANT

## 2024-05-23 PROCEDURE — 83880 ASSAY OF NATRIURETIC PEPTIDE: CPT | Performed by: PHYSICIAN ASSISTANT

## 2024-05-23 PROCEDURE — 85027 COMPLETE CBC AUTOMATED: CPT

## 2024-05-23 PROCEDURE — 83690 ASSAY OF LIPASE: CPT | Performed by: PHYSICIAN ASSISTANT

## 2024-05-23 RX ORDER — NITROGLYCERIN 0.4 MG/1
0.4 TABLET SUBLINGUAL ONCE
Status: COMPLETED | OUTPATIENT
Start: 2024-05-23 | End: 2024-05-23

## 2024-05-23 RX ORDER — ASPIRIN 81 MG/1
324 TABLET, CHEWABLE ORAL ONCE
Status: COMPLETED | OUTPATIENT
Start: 2024-05-23 | End: 2024-05-23

## 2024-05-23 RX ORDER — HEPARIN SODIUM 1000 [USP'U]/ML
4000 INJECTION, SOLUTION INTRAVENOUS; SUBCUTANEOUS EVERY 6 HOURS PRN
Status: DISCONTINUED | OUTPATIENT
Start: 2024-05-23 | End: 2024-05-23 | Stop reason: HOSPADM

## 2024-05-23 RX ORDER — HEPARIN SODIUM 1000 [USP'U]/ML
4000 INJECTION, SOLUTION INTRAVENOUS; SUBCUTANEOUS EVERY 6 HOURS PRN
Status: DISCONTINUED | OUTPATIENT
Start: 2024-05-23 | End: 2024-05-24

## 2024-05-23 RX ORDER — NICOTINE 21 MG/24HR
21 PATCH, TRANSDERMAL 24 HOURS TRANSDERMAL DAILY PRN
Status: DISCONTINUED | OUTPATIENT
Start: 2024-05-23 | End: 2024-05-25 | Stop reason: HOSPADM

## 2024-05-23 RX ORDER — POTASSIUM CHLORIDE 20 MEQ/1
20 TABLET, EXTENDED RELEASE ORAL ONCE
Status: COMPLETED | OUTPATIENT
Start: 2024-05-23 | End: 2024-05-23

## 2024-05-23 RX ORDER — HEPARIN SODIUM 1000 [USP'U]/ML
4000 INJECTION, SOLUTION INTRAVENOUS; SUBCUTANEOUS ONCE
Status: COMPLETED | OUTPATIENT
Start: 2024-05-23 | End: 2024-05-23

## 2024-05-23 RX ORDER — MORPHINE SULFATE 4 MG/ML
4 INJECTION, SOLUTION INTRAMUSCULAR; INTRAVENOUS ONCE
Status: COMPLETED | OUTPATIENT
Start: 2024-05-23 | End: 2024-05-23

## 2024-05-23 RX ORDER — ASPIRIN 81 MG/1
81 TABLET, CHEWABLE ORAL DAILY
Status: DISCONTINUED | OUTPATIENT
Start: 2024-05-24 | End: 2024-05-25 | Stop reason: HOSPADM

## 2024-05-23 RX ORDER — ATORVASTATIN CALCIUM 40 MG/1
40 TABLET, FILM COATED ORAL
Status: DISCONTINUED | OUTPATIENT
Start: 2024-05-23 | End: 2024-05-24

## 2024-05-23 RX ORDER — HEPARIN SODIUM 1000 [USP'U]/ML
2000 INJECTION, SOLUTION INTRAVENOUS; SUBCUTANEOUS EVERY 6 HOURS PRN
Status: DISCONTINUED | OUTPATIENT
Start: 2024-05-23 | End: 2024-05-24

## 2024-05-23 RX ORDER — NITROGLYCERIN 0.4 MG/1
0.4 TABLET SUBLINGUAL
Status: DISCONTINUED | OUTPATIENT
Start: 2024-05-23 | End: 2024-05-25 | Stop reason: HOSPADM

## 2024-05-23 RX ORDER — HEPARIN SODIUM 10000 [USP'U]/100ML
3-20 INJECTION, SOLUTION INTRAVENOUS
Status: DISCONTINUED | OUTPATIENT
Start: 2024-05-23 | End: 2024-05-23 | Stop reason: HOSPADM

## 2024-05-23 RX ORDER — HEPARIN SODIUM 1000 [USP'U]/ML
2000 INJECTION, SOLUTION INTRAVENOUS; SUBCUTANEOUS EVERY 6 HOURS PRN
Status: DISCONTINUED | OUTPATIENT
Start: 2024-05-23 | End: 2024-05-23 | Stop reason: HOSPADM

## 2024-05-23 RX ORDER — HEPARIN SODIUM 1000 [USP'U]/ML
4000 INJECTION, SOLUTION INTRAVENOUS; SUBCUTANEOUS ONCE
Status: DISCONTINUED | OUTPATIENT
Start: 2024-05-23 | End: 2024-05-23

## 2024-05-23 RX ORDER — ONDANSETRON 2 MG/ML
4 INJECTION INTRAMUSCULAR; INTRAVENOUS ONCE
Status: COMPLETED | OUTPATIENT
Start: 2024-05-23 | End: 2024-05-23

## 2024-05-23 RX ORDER — HEPARIN SODIUM 10000 [USP'U]/100ML
3-20 INJECTION, SOLUTION INTRAVENOUS
Status: DISCONTINUED | OUTPATIENT
Start: 2024-05-23 | End: 2024-05-24

## 2024-05-23 RX ORDER — PANTOPRAZOLE SODIUM 40 MG/1
40 TABLET, DELAYED RELEASE ORAL
Status: DISCONTINUED | OUTPATIENT
Start: 2024-05-23 | End: 2024-05-25 | Stop reason: HOSPADM

## 2024-05-23 RX ADMIN — TICAGRELOR 180 MG: 90 TABLET ORAL at 16:08

## 2024-05-23 RX ADMIN — HEPARIN SODIUM 2000 UNITS: 1000 INJECTION INTRAVENOUS; SUBCUTANEOUS at 22:59

## 2024-05-23 RX ADMIN — ASPIRIN 81 MG 324 MG: 81 TABLET ORAL at 16:08

## 2024-05-23 RX ADMIN — POTASSIUM & SODIUM PHOSPHATES POWDER PACK 280-160-250 MG 2 PACKET: 280-160-250 PACK at 23:40

## 2024-05-23 RX ADMIN — MORPHINE SULFATE 4 MG: 4 INJECTION, SOLUTION INTRAMUSCULAR; INTRAVENOUS at 16:02

## 2024-05-23 RX ADMIN — IOHEXOL 100 ML: 350 INJECTION, SOLUTION INTRAVENOUS at 15:29

## 2024-05-23 RX ADMIN — POTASSIUM CHLORIDE 20 MEQ: 1500 TABLET, EXTENDED RELEASE ORAL at 18:46

## 2024-05-23 RX ADMIN — HEPARIN SODIUM 11.1 UNITS/KG/HR: 10000 INJECTION, SOLUTION INTRAVENOUS at 16:15

## 2024-05-23 RX ADMIN — ONDANSETRON 4 MG: 2 INJECTION INTRAMUSCULAR; INTRAVENOUS at 16:02

## 2024-05-23 RX ADMIN — NITROGLYCERIN 0.4 MG: 0.4 TABLET SUBLINGUAL at 16:12

## 2024-05-23 RX ADMIN — NITROGLYCERIN 0.4 MG: 0.4 TABLET SUBLINGUAL at 19:00

## 2024-05-23 RX ADMIN — HEPARIN SODIUM 11.1 UNITS/KG/HR: 10000 INJECTION, SOLUTION INTRAVENOUS at 22:22

## 2024-05-23 RX ADMIN — ATORVASTATIN CALCIUM 40 MG: 40 TABLET, FILM COATED ORAL at 22:37

## 2024-05-23 RX ADMIN — HEPARIN SODIUM 4000 UNITS: 1000 INJECTION INTRAVENOUS; SUBCUTANEOUS at 16:14

## 2024-05-23 RX ADMIN — PANTOPRAZOLE SODIUM 40 MG: 40 TABLET, DELAYED RELEASE ORAL at 22:37

## 2024-05-23 NOTE — EMTALA/ACUTE CARE TRANSFER
[facility] EMERGENCY DEPARTMENT  [address] [address]  Dept: [phone]      EMTALA TRANSFER CONSENT    NAME Cecil Hall                                         1960                              MRN [id number]    I have been informed of my rights regarding examination, treatment, and transfer   by Dr. Ewelina Garcia MD    Benefits: Specialized equipment and/or services available at the receiving facility (Include comment)________________________ (interventional cardiology)    Risks: Potential for delay in receiving treatment, Loss of IV, Potential deterioration of medical condition, Increased discomfort during transfer, Possible worsening of condition or death during transfer      Consent for Transfer:  I acknowledge that my medical condition has been evaluated and explained to me by the emergency department physician or other qualified medical person and/or my attending physician, who has recommended that I be transferred to the service of  Accepting Physician: Dr. Crouch at Accepting Facility Name, City & State : [facility]. The above potential benefits of such transfer, the potential risks associated with such transfer, and the probable risks of not being transferred have been explained to me, and I fully understand them.  The doctor has explained that, in my case, the benefits of transfer outweigh the risks.  I agree to be transferred.    I authorize the performance of emergency medical procedures and treatments upon me in both transit and upon arrival at the receiving facility.  Additionally, I authorize the release of any and all medical records to the receiving facility and request they be transported with me, if possible.  I understand that the safest mode of transportation during a medical emergency is an ambulance and that the Hospital advocates the use of this mode of transport. Risks of traveling to the receiving facility by car, including absence of medical  control, life sustaining equipment, such as oxygen, and medical personnel has been explained to me and I fully understand them.    (NOVA CORRECT BOX BELOW)  [ x ]  I consent to the stated transfer and to be transported by ambulance/helicopter.  [  ]  I consent to the stated transfer, but refuse transportation by ambulance and accept full responsibility for my transportation by car.  I understand the risks of non-ambulance transfers and I exonerate the Hospital and its staff from any deterioration in my condition that results from this refusal.    X___________________________________________    DATE  24  TIME________  Signature of patient or legally responsible individual signing on patient behalf           RELATIONSHIP TO PATIENT_________________________          Provider Certification    NAME Cecil Hall                                        [facility] 1960                              MRN [id number]    A medical screening exam was performed on the above named patient.  Based on the examination:    Condition Necessitating Transfer The primary encounter diagnosis was STEMI (ST elevation myocardial infarction) (HCC). A diagnosis of Chest pain was also pertinent to this visit.    Patient Condition: The patient has been stabilized such that within reasonable medical probability, no material deterioration of the patient condition or the condition of the unborn child(heath) is likely to result from the transfer    Reason for Transfer: Level of Care needed not available at this facility    Transfer Requirements: Facility B   Space available and qualified personnel available for treatment as acknowledged by PACS  Agreed to accept transfer and to provide appropriate medical treatment as acknowledged by       Dr. Crouch  Appropriate medical records of the examination and treatment of the patient are provided at the time of transfer   STAFF INITIAL WHEN COMPLETED _______  Transfer will be performed by qualified  personnel from    and appropriate transfer equipment as required, including the use of necessary and appropriate life support measures.    Provider Certification: I have examined the patient and explained the following risks and benefits of being transferred/refusing transfer to the patient/family:  General risk, such as traffic hazards, adverse weather conditions, rough terrain or turbulence, possible failure of equipment (including vehicle or aircraft), or consequences of actions of persons outside the control of the transport personnel, Unanticipated needs of medical equipment and personnel during transport, Risk of worsening condition, The possibility of a transport vehicle being unavailable      Based on these reasonable risks and benefits to the patient and/or the unborn child(heath), and based upon the information available at the time of the patient’s examination, I certify that the medical benefits reasonably to be expected from the provision of appropriate medical treatments at another medical facility outweigh the increasing risks, if any, to the individual’s medical condition, and in the case of labor to the unborn child, from effecting the transfer.    X____________________________________________ DATE 05/23/24        TIME_______      ORIGINAL - SEND TO MEDICAL RECORDS   COPY - SEND WITH PATIENT DURING TRANSFER

## 2024-05-23 NOTE — ED PROVIDER NOTES
"History  Chief Complaint   Patient presents with    Chest Pain     Chest pain off and on, worse with exertion for 3 weeks. States burping and Mylanta relives pain. Seen here previously and dx with GERD     63 year old male with PMH GERD, HTN, HLD presenting for evaluation of chest pain.  Pt reports this has been ongoing for quite a while.  He tells me he regularly experiences pain in epigastric region of his abdomen which radiates into his mid chest.  He reports associated burping and gas.  He states he feels symptoms is related to bad heartburn.  He takes medication regularly for this.  He reports he's been evaluated before and states he was told his \"heart is fine\".  He notes prior to arrival he was moving grates and states he was doing a lot of lifting and had increased pain when doing so.  He feels he does experience more pain with exertion.  Denies back pain.  Denies fever, chills, cough, congestion.  No reported falls.  Denies dizziness, lightheadedness.  No syncope.  Denies N/V/D/C.  Denies leg pain or swelling.  He reports around 1:30 pm he took a dose of mylanta and he feels this has kicked in and now having resolution of his symptoms.  Both him and wife feel he needs further GI evaluation for his GERD.  Wife notes he did eat chili last night and she feels this exacerbated his symptoms.  He also took advil around lunch time.      History provided by:  Patient and medical records   used: No    Chest Pain  Pain location:  Epigastric  Radiates to: mid chest.  Pain radiates to the back: no    Chronicity:  New  Context: no trauma    Relieved by:  Nothing  Worsened by:  Exertion  Associated symptoms: abdominal pain, heartburn and nausea    Associated symptoms: no back pain, no cough, no diaphoresis, no dizziness, no fatigue, no fever, no headache, no lower extremity edema, no near-syncope, no numbness, no palpitations, no shortness of breath, no syncope, not vomiting and no weakness    Risk " factors: male sex and smoking    Risk factors: no aortic disease, no coronary artery disease, no diabetes mellitus, no prior DVT/PE and no surgery        Prior to Admission Medications   Prescriptions Last Dose Informant Patient Reported? Taking?   aluminum-magnesium hydroxide-simethicone (MYLANTA) 200-200-20 mg/5 mL suspension   No No   Sig: Take 30 mL by mouth every 2 (two) hours as needed for indigestion or heartburn   Patient not taking: Reported on 5/25/2022   aspirin (ECOTRIN LOW STRENGTH) 81 mg EC tablet   No No   Sig: Take 1 tablet (81 mg total) by mouth daily   Patient not taking: Reported on 5/25/2022   atorvastatin (LIPITOR) 40 mg tablet   No No   Sig: Take 1 tablet (40 mg total) by mouth daily with dinner   Patient not taking: Reported on 5/25/2022   metoprolol succinate (TOPROL-XL) 25 mg 24 hr tablet   No No   Sig: Take 1 tablet (25 mg total) by mouth daily   Patient not taking: Reported on 5/25/2022   naproxen (Naprosyn) 500 mg tablet   No No   Sig: Take 1 tablet (500 mg total) by mouth 2 (two) times a day with meals   nicotine (NICODERM CQ) 14 mg/24hr TD 24 hr patch   No No   Sig: Place 1 patch on the skin every 24 hours   Patient not taking: Reported on 5/25/2022   nicotine polacrilex (COMMIT) 4 MG lozenge   No No   Sig: Apply 1 lozenge (4 mg total) to the mouth or throat as needed for smoking cessation   Patient not taking: Reported on 5/25/2022   nicotine polacrilex (NICORETTE) 4 mg gum   No No   Sig: Chew 1 each (4 mg total) as needed for smoking cessation   Patient not taking: Reported on 5/25/2022   omeprazole (PriLOSEC) 20 mg delayed release capsule   Yes No   Sig: Take 20 mg by mouth daily      Facility-Administered Medications: None       Past Medical History:   Diagnosis Date    GERD (gastroesophageal reflux disease)     Pneumohemothorax        Past Surgical History:   Procedure Laterality Date    HERNIA REPAIR         History reviewed. No pertinent family history.  I have reviewed and agree  with the history as documented.    E-Cigarette/Vaping    E-Cigarette Use Never User      E-Cigarette/Vaping Substances    Nicotine No     THC No     CBD No     Flavoring No     Other No     Unknown No      Social History     Tobacco Use    Smoking status: Every Day     Current packs/day: 0.50     Types: Cigarettes    Smokeless tobacco: Never   Vaping Use    Vaping status: Never Used       Review of Systems   Constitutional: Negative.  Negative for chills, diaphoresis, fatigue and fever.   HENT: Negative.  Negative for congestion, rhinorrhea and sore throat.    Eyes: Negative.  Negative for visual disturbance.   Respiratory: Negative.  Negative for cough, shortness of breath and wheezing.    Cardiovascular:  Positive for chest pain. Negative for palpitations, leg swelling, syncope and near-syncope.   Gastrointestinal:  Positive for abdominal pain, heartburn and nausea. Negative for constipation, diarrhea and vomiting.   Genitourinary: Negative.  Negative for dysuria, flank pain, frequency and hematuria.   Musculoskeletal: Negative.  Negative for back pain, myalgias and neck pain.   Skin: Negative.  Negative for rash.   Neurological: Negative.  Negative for dizziness, syncope, weakness, light-headedness, numbness and headaches.   Psychiatric/Behavioral: Negative.     All other systems reviewed and are negative.      Physical Exam  Physical Exam  Vitals and nursing note reviewed.   Constitutional:       General: He is awake. He is not in acute distress.     Appearance: Normal appearance. He is well-developed. He is not toxic-appearing or diaphoretic.   HENT:      Head: Normocephalic and atraumatic.      Right Ear: Hearing and external ear normal.      Left Ear: Hearing and external ear normal.      Nose: Nose normal.      Mouth/Throat:      Lips: Pink.      Mouth: Mucous membranes are moist.      Tongue: Tongue does not deviate from midline.      Pharynx: Oropharynx is clear. Uvula midline.   Eyes:      General: Lids  are normal. No scleral icterus.     Extraocular Movements: Extraocular movements intact.      Conjunctiva/sclera: Conjunctivae normal.      Pupils: Pupils are equal, round, and reactive to light.   Neck:      Trachea: Trachea and phonation normal.   Cardiovascular:      Rate and Rhythm: Normal rate and regular rhythm.      Pulses: Normal pulses.           Radial pulses are 2+ on the right side and 2+ on the left side.        Dorsalis pedis pulses are 2+ on the right side and 2+ on the left side.        Posterior tibial pulses are 2+ on the right side and 2+ on the left side.      Heart sounds: Normal heart sounds, S1 normal and S2 normal. No murmur heard.  Pulmonary:      Effort: Pulmonary effort is normal. No tachypnea or respiratory distress.      Breath sounds: Normal breath sounds. No wheezing, rhonchi or rales.   Abdominal:      General: Bowel sounds are normal. There is no distension.      Palpations: Abdomen is soft.      Tenderness: There is no abdominal tenderness. There is no guarding or rebound.      Comments: Pain reported in epigastric region, no tenderness elicited on exam.   Musculoskeletal:         General: No tenderness.      Cervical back: Normal range of motion and neck supple.      Right lower leg: No tenderness. No edema.      Left lower leg: No tenderness. No edema.   Skin:     General: Skin is warm and dry.      Capillary Refill: Capillary refill takes less than 2 seconds.      Findings: No rash.   Neurological:      General: No focal deficit present.      Mental Status: He is alert and oriented to person, place, and time.      GCS: GCS eye subscore is 4. GCS verbal subscore is 5. GCS motor subscore is 6.      Cranial Nerves: Cranial nerves 2-12 are intact.      Sensory: Sensation is intact.      Motor: Motor function is intact.      Gait: Gait normal.   Psychiatric:         Mood and Affect: Mood normal.         Speech: Speech normal.         Behavior: Behavior normal. Behavior is cooperative.          Vital Signs  ED Triage Vitals   Temperature Pulse Respirations Blood Pressure SpO2   05/23/24 1615 05/23/24 1410 05/23/24 1410 05/23/24 1410 05/23/24 1410   97.6 °F (36.4 °C) 89 12 147/77 99 %      Temp Source Heart Rate Source Patient Position - Orthostatic VS BP Location FiO2 (%)   05/23/24 1615 05/23/24 1410 05/23/24 1606 05/23/24 1715 --   Temporal Monitor Lying Left arm       Pain Score       05/23/24 1602       10 - Worst Possible Pain           Vitals:    05/23/24 1931 05/23/24 1946 05/23/24 2001 05/23/24 2016   BP: 120/75 106/66 111/68 102/58   Pulse: 66 64 65 63   Patient Position - Orthostatic VS:             Visual Acuity      ED Medications  Medications   iohexol (OMNIPAQUE) 350 MG/ML injection (MULTI-DOSE) 100 mL (100 mL Intravenous Given 5/23/24 1529)   ondansetron (ZOFRAN) injection 4 mg (4 mg Intravenous Given 5/23/24 1602)   morphine injection 4 mg (4 mg Intravenous Given 5/23/24 1602)   aspirin chewable tablet 324 mg (324 mg Oral Given 5/23/24 1608)   ticagrelor (BRILINTA) tablet 180 mg (180 mg Oral Given 5/23/24 1608)   nitroglycerin (NITROSTAT) SL tablet 0.4 mg (0.4 mg Sublingual Given 5/23/24 1612)   heparin (porcine) injection 4,000 Units (4,000 Units Intravenous Given 5/23/24 1614)   potassium chloride (Klor-Con M20) CR tablet 20 mEq (20 mEq Oral Given 5/23/24 1846)   nitroglycerin (NITROSTAT) SL tablet 0.4 mg (0.4 mg Sublingual Given 5/23/24 1900)       Diagnostic Studies  Results Reviewed       Procedure Component Value Units Date/Time    APTT [[id number]]     Lab Status: No result Specimen: Blood     HS Troponin I 4hr [[id number]]  (Abnormal) Collected: 05/23/24 1829    Lab Status: Final result Specimen: Blood from Arm, Left Updated: 05/23/24 1911     hs TnI 4hr 433 ng/L      Delta 4hr hsTnI 427 ng/L     APTT six (6) hours after Heparin bolus/drip initiation or dosing change [[id number]]  (Normal) Collected: 05/23/24 1610    Lab Status: Final result Specimen: Blood from Arm, Right  Updated: 05/23/24 1635     PTT 25 seconds     Protime-INR [[id number]]  (Normal) Collected: 05/23/24 1610    Lab Status: Final result Specimen: Blood from Arm, Right Updated: 05/23/24 1635     Protime 12.3 seconds      INR 0.92    HS Troponin I 2hr [[id number]]  (Abnormal) Collected: 05/23/24 1604    Lab Status: Final result Specimen: Blood from Arm, Left Updated: 05/23/24 1631     hs TnI 2hr 115 ng/L      Delta 2hr hsTnI 109 ng/L     B-Type Natriuretic Peptide(BNP) [[id number]]  (Normal) Collected: 05/23/24 1409    Lab Status: Final result Specimen: Blood from Arm, Left Updated: 05/23/24 1501     BNP 17 pg/mL     Lipase [[id number]]  (Normal) Collected: 05/23/24 1409    Lab Status: Final result Specimen: Blood from Arm, Left Updated: 05/23/24 1458     Lipase 24 u/L     Magnesium [[id number]]  (Normal) Collected: 05/23/24 1409    Lab Status: Final result Specimen: Blood from Arm, Left Updated: 05/23/24 1458     Magnesium 1.9 mg/dL     D-Dimer [[id number]]  (Abnormal) Collected: 05/23/24 1409    Lab Status: Final result Specimen: Blood from Arm, Left Updated: 05/23/24 1444     D-Dimer, Quant 0.60 ug/ml FEU     Narrative:      In the evaluation for possible pulmonary embolism, in the appropriate (Well's Score of 4 or less) patient, the age adjusted d-dimer cutoff for this patient can be calculated as:    Age x 0.01 (in ug/mL) for Age-adjusted D-dimer exclusion threshold for a patient over 50 years.    HS Troponin 0hr (reflex protocol) [[id number]]  (Normal) Collected: 05/23/24 1409    Lab Status: Final result Specimen: Blood from Arm, Left Updated: 05/23/24 1443     hs TnI 0hr 6 ng/L     Comprehensive metabolic panel [[id number]]  (Abnormal) Collected: 05/23/24 1409    Lab Status: Final result Specimen: Blood from Arm, Left Updated: 05/23/24 1442     Sodium 133 mmol/L      Potassium 3.4 mmol/L      Chloride 98 mmol/L      CO2 20 mmol/L      ANION GAP 15 mmol/L      BUN 11 mg/dL      Creatinine 1.12 mg/dL      Glucose 138  mg/dL      Calcium 9.9 mg/dL      AST 18 U/L      ALT 12 U/L      Alkaline Phosphatase 59 U/L      Total Protein 7.7 g/dL      Albumin 4.4 g/dL      Total Bilirubin 0.62 mg/dL      eGFR 70 ml/min/1.73sq m     Narrative:      [employer] guidelines for Chronic Kidney Disease (CKD):     Stage 1 with normal or high GFR (GFR > 90 mL/min/1.73 square meters)    Stage 2 Mild CKD (GFR = 60-89 mL/min/1.73 square meters)    Stage 3A Moderate CKD (GFR = 45-59 mL/min/1.73 square meters)    Stage 3B Moderate CKD (GFR = 30-44 mL/min/1.73 square meters)    Stage 4 Severe CKD (GFR = 15-29 mL/min/1.73 square meters)    Stage 5 End Stage CKD (GFR <15 mL/min/1.73 square meters)  Note: GFR calculation is accurate only with a steady state creatinine    CBC and differential [[id number]]  (Abnormal) Collected: 05/23/24 1409    Lab Status: Final result Specimen: Blood from Arm, Left Updated: 05/23/24 1421     WBC 11.26 Thousand/uL      RBC 4.59 Million/uL      Hemoglobin 14.9 g/dL      Hematocrit 42.7 %      MCV 93 fL      MCH 32.5 pg      MCHC 34.9 g/dL      RDW 12.1 %      MPV 10.1 fL      Platelets 245 Thousands/uL      nRBC 0 /100 WBCs      Segmented % 36 %      Immature Grans % 0 %      Lymphocytes % 49 %      Monocytes % 11 %      Eosinophils Relative 3 %      Basophils Relative 1 %      Absolute Neutrophils 4.03 Thousands/µL      Absolute Immature Grans 0.05 Thousand/uL      Absolute Lymphocytes 5.58 Thousands/µL      Absolute Monocytes 1.19 Thousand/µL      Eosinophils Absolute 0.35 Thousand/µL      Basophils Absolute 0.06 Thousands/µL                    CTA dissection protocol chest/abdomen/pelvis   Final Result by Yvrose Justin MD (05/23 1600)   No acute vascular pathology. 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      No acute findings in the chest, abdomen or pelvis.      Nonemergent findings above.                  Workstation performed: [id number]         XR chest 1 view  portable   Final Result by Yvrose Justin MD (05/23 1601)   No acute cardiopulmonary findings.                  Workstation performed: [id number]                    Procedures  ECG 12 Lead Documentation Only    Date/Time: 5/23/2024 2:10 PM    Performed by: Sylwia Hand PA-C  Authorized by: Sylwia Hand PA-C    Indications / Diagnosis:  Chest pain  ECG reviewed by me, the ED Provider: yes    Patient location:  ED  Previous ECG:     Comparison to cardiac monitor: Yes    Interpretation:     Interpretation: non-specific    Rate:     ECG rate:  93    ECG rate assessment: normal    Rhythm:     Rhythm: sinus rhythm    Ectopy:     Ectopy: none    QRS:     QRS axis:  Normal    QRS intervals:  Normal  Conduction:     Conduction: normal    ST segments:     ST segments:  Non-specific  T waves:     T waves: normal    Comments:      , QRS 84, QT//452  ECG 12 Lead Documentation Only    Date/Time: 5/23/2024 4:04 PM    Performed by: Sylwia Hand PA-C  Authorized by: Sylwia Hand PA-C    Indications / Diagnosis:  Chest pain  ECG reviewed by me, the ED Provider: yes    Patient location:  ED  Previous ECG:     Previous ECG:  Compared to current    Similarity:  Changes noted    Comparison to cardiac monitor: Yes    Interpretation:     Interpretation: abnormal    Rate:     ECG rate:  100    ECG rate assessment: tachycardic    Rhythm:     Rhythm: sinus tachycardia    Ectopy:     Ectopy: none    QRS:     QRS axis:  Normal    QRS intervals:  Normal  Conduction:     Conduction: normal    ST segments:     ST segments:  Abnormal    Elevation:  III, aVR, aVF, V1, V2 and V3    Depression:  I, V5 and V6  T waves:     T waves: normal    Comments:      , QRS 88, QT//451; findings concerning for acute STEMI  ECG 12 Lead Documentation Only    Date/Time: 5/23/2024 4:17 PM    Performed by: Sylwia Hand PA-C  Authorized by: Sylwia Hand PA-C    Indications / Diagnosis:  Chest pain - post  medications (chest pain resolved at time of capture)  ECG reviewed by me, the ED Provider: yes    Patient location:  ED  Previous ECG:     Previous ECG:  Compared to current    Similarity:  Changes noted    Comparison to cardiac monitor: Yes    Interpretation:     Interpretation: non-specific    Rate:     ECG rate:  89    ECG rate assessment: normal    Rhythm:     Rhythm: sinus rhythm    Ectopy:     Ectopy: none    QRS:     QRS axis:  Normal    QRS intervals:  Normal  Conduction:     Conduction: normal    ST segments:     ST segments:  Non-specific  T waves:     T waves: normal    Comments:      , QRS 88, QT//469, previously noted elevations and depressions have improved.  ECG 12 Lead Documentation Only    Date/Time: 5/23/2024 6:31 PM    Performed by: Sylwia Hand PA-C  Authorized by: Sylwia Hand PA-C    Indications / Diagnosis:  Repeat EKG with 4hr troponin  ECG reviewed by me, the ED Provider: yes    Patient location:  ED  Previous ECG:     Comparison to cardiac monitor: Yes    Interpretation:     Interpretation: non-specific    Rate:     ECG rate:  74    ECG rate assessment: normal    Rhythm:     Rhythm: sinus rhythm    Ectopy:     Ectopy: none    QRS:     QRS axis:  Normal    QRS intervals:  Normal  Conduction:     Conduction: normal    ST segments:     ST segments:  Non-specific  T waves:     T waves: normal    Comments:      , QRS 82, QT//439  ECG 12 Lead Documentation Only    Date/Time: 5/23/2024 6:59 PM    Performed by: Sylwia Hand PA-C  Authorized by: Sylwia Hand PA-C    Indications / Diagnosis:  Chest pain  ECG reviewed by me, the ED Provider: yes    Patient location:  ED  Previous ECG:     Comparison to cardiac monitor: Yes    Interpretation:     Interpretation: abnormal    Rate:     ECG rate:  94    ECG rate assessment: normal    Rhythm:     Rhythm: sinus rhythm    QRS:     QRS axis:  Normal    QRS intervals:  Normal  Conduction:     Conduction: normal     ST segments:     ST segments:  Non-specific    Depression:  I, V5, V6 and aVL  T waves:     T waves: normal    Comments:      , QRS 80, QT//450  CriticalCare Time    Date/Time: 5/23/2024 7:20 PM    Performed by: Sylwia Hand PA-C  Authorized by: Sylwia Hand PA-C    Critical care provider statement:     Critical care time (minutes):  36    Critical care time was exclusive of:  Separately billable procedures and treating other patients and teaching time    Critical care was necessary to treat or prevent imminent or life-threatening deterioration of the following conditions:  Cardiac failure    Critical care was time spent personally by me on the following activities:  Blood draw for specimens, obtaining history from patient or surrogate, discussions with consultants, development of treatment plan with patient or surrogate, evaluation of patient's response to treatment, examination of patient, ordering and performing treatments and interventions, ordering and review of laboratory studies, ordering and review of radiographic studies, re-evaluation of patient's condition and review of old charts    I assumed direction of critical care for this patient from another provider in my specialty: no             ED Course  ED Course as of 05/23/24 2227   Thu May 23, 2024   1429 Pt states chest pain resolved at this time.   1430 WBC(!): 11.26  Leukocytosis noted on prior checks   1430 Hemoglobin: 14.9   1430 Platelet Count: 245   1433 XR chest 1 view portable  Independently viewed and interpreted by me - no acute cardiopulmonary process; pending official read.   1452 GLUCOSE: 138   1452 Creatinine: 1.12  Value of 0.78 a year ago   1452 BUN: 11   1452 Sodium(!): 133   1452 Potassium(!): 3.4   1452 Chloride: 98   1452 Carbon Dioxide(!): 20   1452 ANION GAP(!): 15   1452 Calcium: 9.9   1452 AST: 18   1453 ALT: 12   1453 ALK PHOS: 59   1453 Total Protein: 7.7   1453 Albumin: 4.4   1453 Total Bilirubin:  "0.62   1453 GFR, Calculated: 70   1453 D-Dimer, Quant(!): 0.60  Considered negative for age   1453 hs TnI 0hr: 6  Not diagnostic of ACS however will require a delta to exclude.   1515 LIPASE: 24  Not c/w pancreatitis   1515 MAGNESIUM: 1.9   1515 BNP: 17   1555 Pt c/o return of and increased chest pain since returning from CT scan.   1559 Pt c/o acute sudden onset severe mid chest pain.  He appears restless in bed.  Will provide meds and repeat EKG.   1604 STEMI alert called given EKG changes along with his chest pain, contacted PACS, sent EKG to Dr. Crouch and spoke with her via phone.  Recommends aspirin, brilinta, heparin bolus and drip and SL nitro with repeat EKG in about 2 minutes.  Accepts in transfer for emergent cath.   1608 CTA dissection protocol chest/abdomen/pelvis  IMPRESSION:  No acute vascular pathology. 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     No acute findings in the chest, abdomen or pelvis.     Nonemergent findings above.   1635 Per Dr. Crouch, given improvement of EKG post meds and nitro, is cancelling STEMI, suspects could be having vasospasm.  Would still like him transferred but anticipates [facility] tomorrow.     1639 POCT INR: 0.92   1639 PROTIME: 12.3   1639 PTT: 25   1639 Delta 2hr hsTnI(!): 109   1706 Awaiting to hear back from PACS regarding transfer arrangements.  STEMI alert was cancelled by interventional cardiology, awaiting transfer to [facility] with admission to [facility].   1806 Pt reassessed.  Resting comfortably.  Denies chest pain at present.   1850 Spoke with Dr. Julienne Zhang of [facility]; accepts in transfer.   1855 Accepted SOD A Dr. Eason MS tele.   1858 Pt now again complaining of chest pain - repeat EKG obtained.  Will repeat nitro dose.   1910 Pain resolved post nitro dose.   1914 Delta 4hr hsTnI(!): 427  Increasing troponin   2030 EMS here for patient .       Last echo was 12/4/20 - \"SUMMARY     LEFT VENTRICLE:  Size was normal.  Systolic function was " "normal. Ejection fraction was estimated to be 60 %.  There were no regional wall motion abnormalities.  Wall thickness was normal.  There was no evidence of concentric hypertrophy.\"    Pt had cardiac cath performed 11/25/20 - \"VENTRICLES:   --  There was no diagnostic evidence of left ventricular regional wall motion abnormalities. EF calculated by contrast ventriculography was 60 %.     CORONARY VESSELS:   --  The coronary circulation is right dominant.  --  Left main: Normal.  --  LAD: The vessel was medium sized. Angiography showed no evidence of disease.  --  1st diagonal: The vessel was small sized.  --  2nd diagonal: The vessel was small sized.  --  Circumflex: The vessel was small sized. Angiography showed no evidence of disease.  --  Ramus intermedius: The vessel was medium sized. Angiography showed no evidence of disease.  --  RCA: The vessel was large sized (dominant). Angiography showed no evidence of disease.\"    HEART Risk Score      Flowsheet Row Most Recent Value   Heart Score Risk Calculator    History 1 Filed at: 05/23/2024 1442   ECG 1 Filed at: 05/23/2024 1442   Age 1 Filed at: 05/23/2024 1442   Risk Factors 1 Filed at: 05/23/2024 1442   Troponin 0 Filed at: 05/23/2024 1442   HEART Score 4 Filed at: 05/23/2024 1442                              Wells' Criteria for PE      Flowsheet Row Most Recent Value   Wells' Criteria for PE    Clinical signs and symptoms of DVT 0 Filed at: 05/23/2024 1442   PE is primary diagnosis or equally likely 0 Filed at: 05/23/2024 1442   HR >100 0 Filed at: 05/23/2024 1442   Immobilization at least 3 days or Surgery in the previous 4 weeks 0 Filed at: 05/23/2024 1442   Previous, objectively diagnosed PE or DVT 0 Filed at: 05/23/2024 1442   Hemoptysis 0 Filed at: 05/23/2024 1442   Malignancy with treatment within 6 months or palliative 0 Filed at: 05/23/2024 1442   Wells' Criteria Total 0 Filed at: 05/23/2024 1442                  Medical Decision Making  62 yo male " presenting for evaluation of chest pain.  Prior records reviewed.  He is asymptomatic upon arrival.  Will check EKG, CXR, labs.      Work up obtained as noted above.  Initial EKG unrevealing.  Initial troponin of 6.  During ER course he did develop acute chest pain and EKG changes noted concerning for STEMI.  A STEMI alert was activated and discussed with interventional cardiology.  CXR does not reveal pneumonia, pneumothorax, vascular congestion or pleural effusion.  No significant noted leukocytosis, no anemia.  No infectious concerns.  No hypo or hyperglycemia.  Renal function within normal limits.  Electrolytes within normal limits except for a mild hypokalemia which was repleted.  D dimer negative for age.  Given reports of chest pain, abdominal pain, CT imaging pursued.  CTA dissection study negative for acute pathology.  Troponin levels increased serially.  Per discussion with interventional cardiology, recommendation made for aspirin, heparin, brilinta and nitro.  Pt's symptoms improved post nitro and EKG normalized.  The STEMI alert was canceled by cardiology however did agree with need for transfer and LH cath but felt this could be performed less emergently.  Pt was ultimately accepted by [facility] at [facility] and transferred in stable condition.  Pt not having any chest pain at time of transfer and EKG without elevations or depressions on subsequent repeat.    Pt and spouse comfortable with plan of care.      Please refer to above ER course for further details/discussion.          Problems Addressed:  Chest pain: acute illness or injury  STEMI (ST elevation myocardial infarction) (HCC): acute illness or injury    Amount and/or Complexity of Data Reviewed  Independent Historian: spouse  External Data Reviewed: labs, radiology, ECG and notes.  Labs: ordered. Decision-making details documented in ED Course.  Radiology: ordered and independent interpretation performed. Decision-making details documented in ED  Course.  ECG/medicine tests: ordered and independent interpretation performed. Decision-making details documented in ED Course.  Discussion of management or test interpretation with external provider(s): Attending, cardiology, [facility]    Risk  Prescription drug management.  Decision regarding hospitalization.             Disposition  Final diagnoses:   STEMI (ST elevation myocardial infarction) (HCC)   Chest pain     Time reflects when diagnosis was documented in both MDM as applicable and the Disposition within this note       Time User Action Codes Description Comment    5/23/2024  4:15 PM Sylwia Hand Add [I21.3] STEMI (ST elevation myocardial infarction) (HCC)     5/23/2024  4:15 PM Sylwia Hand Add [R07.9] Chest pain           ED Disposition       ED Disposition   Transfer to Another Facility-In Network    Condition   --    Date/Time   Thu May 23, 2024 1615    Comment   Cecil Hall should be transferred out to [facility].               MD Documentation      Flowsheet Row Most Recent Value   Patient Condition The patient has been stabilized such that within reasonable medical probability, no material deterioration of the patient condition or the condition of the unborn child(heath) is likely to result from the transfer   Reason for Transfer Level of Care needed not available at this facility   Benefits of Transfer Specialized equipment and/or services available at the receiving facility (Include comment)________________________  [interventional cardiology]   Risks of Transfer Potential for delay in receiving treatment, Loss of IV, Potential deterioration of medical condition, Increased discomfort during transfer, Possible worsening of condition or death during transfer   Accepting Physician Dr. Crouch   Accepting Facility Name, City & State  [facility]    (Name & Tel number) PACS   Sending MD Dr. Garcia   Provider Certification General risk, such as traffic hazards, adverse weather conditions,  rough terrain or turbulence, possible failure of equipment (including vehicle or aircraft), or consequences of actions of persons outside the control of the transport personnel, Unanticipated needs of medical equipment and personnel during transport, Risk of worsening condition, The possibility of a transport vehicle being unavailable          RN Documentation      Flowsheet Row Most Recent Value   Accepting Facility Name, City & State  [facility]    (Name & Tel number) PACS          Follow-up Information    None         Discharge Medication List as of 5/23/2024  8:37 PM        CONTINUE these medications which have NOT CHANGED    Details   aluminum-magnesium hydroxide-simethicone (MYLANTA) 200-200-20 mg/5 mL suspension Take 30 mL by mouth every 2 (two) hours as needed for indigestion or heartburn, Starting Wed 11/25/2020, No Print      aspirin (ECOTRIN LOW STRENGTH) 81 mg EC tablet Take 1 tablet (81 mg total) by mouth daily, Starting Thu 11/26/2020, No Print      atorvastatin (LIPITOR) 40 mg tablet Take 1 tablet (40 mg total) by mouth daily with dinner, Starting Tue 12/1/2020, Normal      metoprolol succinate (TOPROL-XL) 25 mg 24 hr tablet Take 1 tablet (25 mg total) by mouth daily, Starting Wed 11/25/2020, Print      naproxen (Naprosyn) 500 mg tablet Take 1 tablet (500 mg total) by mouth 2 (two) times a day with meals, Starting Fri 9/8/2023, Normal      nicotine (NICODERM CQ) 14 mg/24hr TD 24 hr patch Place 1 patch on the skin every 24 hours, Starting Wed 12/16/2020, Normal      nicotine polacrilex (COMMIT) 4 MG lozenge Apply 1 lozenge (4 mg total) to the mouth or throat as needed for smoking cessation, Starting Wed 12/16/2020, Normal      nicotine polacrilex (NICORETTE) 4 mg gum Chew 1 each (4 mg total) as needed for smoking cessation, Starting Wed 12/16/2020, Normal      omeprazole (PriLOSEC) 20 mg delayed release capsule Take 20 mg by mouth daily, Historical Med             No discharge procedures  on file.    PDMP Review       None            ED Provider  Electronically Signed by             Sylwia Hand PA-C  05/23/24 2443

## 2024-05-24 ENCOUNTER — APPOINTMENT (INPATIENT)
Dept: NON INVASIVE DIAGNOSTICS | Facility: HOSPITAL | Age: 64
DRG: 282 | End: 2024-05-24
Payer: COMMERCIAL

## 2024-05-24 LAB
ANION GAP SERPL CALCULATED.3IONS-SCNC: 10 MMOL/L (ref 4–13)
AORTIC ROOT: 3.5 CM
APICAL FOUR CHAMBER EJECTION FRACTION: 52 %
APTT PPP: 57 SECONDS (ref 23–37)
ASCENDING AORTA: 3.2 CM
ATRIAL RATE: 100 BPM
ATRIAL RATE: 60 BPM
ATRIAL RATE: 68 BPM
ATRIAL RATE: 74 BPM
ATRIAL RATE: 89 BPM
ATRIAL RATE: 94 BPM
BASOPHILS # BLD AUTO: 0.06 THOUSANDS/ÂΜL (ref 0–0.1)
BASOPHILS NFR BLD AUTO: 1 % (ref 0–1)
BSA FOR ECHO PROCEDURE: 2.07 M2
BUN SERPL-MCNC: 9 MG/DL (ref 5–25)
CALCIUM SERPL-MCNC: 8.9 MG/DL (ref 8.4–10.2)
CHLORIDE SERPL-SCNC: 102 MMOL/L (ref 96–108)
CHOLEST SERPL-MCNC: 169 MG/DL
CO2 SERPL-SCNC: 23 MMOL/L (ref 21–32)
CREAT SERPL-MCNC: 0.84 MG/DL (ref 0.6–1.3)
E WAVE DECELERATION TIME: 359 MS
E/A RATIO: 1
EOSINOPHIL # BLD AUTO: 0.27 THOUSAND/ÂΜL (ref 0–0.61)
EOSINOPHIL NFR BLD AUTO: 3 % (ref 0–6)
ERYTHROCYTE [DISTWIDTH] IN BLOOD BY AUTOMATED COUNT: 12.7 % (ref 11.6–15.1)
FRACTIONAL SHORTENING: 35 (ref 28–44)
GFR SERPL CREATININE-BSD FRML MDRD: 93 ML/MIN/1.73SQ M
GLUCOSE SERPL-MCNC: 107 MG/DL (ref 65–140)
HCT VFR BLD AUTO: 39.5 % (ref 36.5–49.3)
HDLC SERPL-MCNC: 35 MG/DL
HGB BLD-MCNC: 13.7 G/DL (ref 12–17)
IMM GRANULOCYTES # BLD AUTO: 0.04 THOUSAND/UL (ref 0–0.2)
IMM GRANULOCYTES NFR BLD AUTO: 0 % (ref 0–2)
INTERVENTRICULAR SEPTUM IN DIASTOLE (PARASTERNAL SHORT AXIS VIEW): 0.9 CM
INTERVENTRICULAR SEPTUM: 0.9 CM (ref 0.6–1.1)
LA/AORTA RATIO 2D: 1.06
LAAS-AP2: 15.4 CM2
LAAS-AP4: 19.1 CM2
LDLC SERPL CALC-MCNC: 120 MG/DL (ref 0–100)
LEFT ATRIUM SIZE: 3.7 CM
LEFT ATRIUM VOLUME (MOD BIPLANE): 51 ML
LEFT ATRIUM VOLUME INDEX (MOD BIPLANE): 24.6 ML/M2
LEFT INTERNAL DIMENSION IN SYSTOLE: 3.3 CM (ref 2.1–4)
LEFT VENTRICLE DIASTOLIC VOLUME (MOD BIPLANE): 99 ML
LEFT VENTRICLE DIASTOLIC VOLUME INDEX (MOD BIPLANE): 47.8 ML/M2
LEFT VENTRICLE SYSTOLIC VOLUME (MOD BIPLANE): 46 ML
LEFT VENTRICLE SYSTOLIC VOLUME INDEX (MOD BIPLANE): 22.2 ML/M2
LEFT VENTRICULAR INTERNAL DIMENSION IN DIASTOLE: 5.1 CM (ref 3.5–6)
LEFT VENTRICULAR POSTERIOR WALL IN END DIASTOLE: 1 CM
LEFT VENTRICULAR STROKE VOLUME: 77 ML
LV EF: 54 %
LVSV (TEICH): 77 ML
LYMPHOCYTES # BLD AUTO: 3.08 THOUSANDS/ÂΜL (ref 0.6–4.47)
LYMPHOCYTES NFR BLD AUTO: 32 % (ref 14–44)
MCH RBC QN AUTO: 33.3 PG (ref 26.8–34.3)
MCHC RBC AUTO-ENTMCNC: 34.7 G/DL (ref 31.4–37.4)
MCV RBC AUTO: 96 FL (ref 82–98)
MONOCYTES # BLD AUTO: 1.06 THOUSAND/ÂΜL (ref 0.17–1.22)
MONOCYTES NFR BLD AUTO: 11 % (ref 4–12)
MV E'TISSUE VEL-SEP: 9 CM/S
MV PEAK A VEL: 0.52 M/S
MV PEAK E VEL: 52 CM/S
MV STENOSIS PRESSURE HALF TIME: 105 MS
MV VALVE AREA P 1/2 METHOD: 2.1
NEUTROPHILS # BLD AUTO: 5.13 THOUSANDS/ÂΜL (ref 1.85–7.62)
NEUTS SEG NFR BLD AUTO: 53 % (ref 43–75)
NRBC BLD AUTO-RTO: 0 /100 WBCS
P AXIS: -16 DEGREES
P AXIS: -18 DEGREES
P AXIS: -20 DEGREES
P AXIS: -21 DEGREES
P AXIS: 70 DEGREES
P AXIS: 72 DEGREES
PHOSPHATE SERPL-MCNC: 2.3 MG/DL (ref 2.3–4.1)
PLATELET # BLD AUTO: 218 THOUSANDS/UL (ref 149–390)
PMV BLD AUTO: 10.2 FL (ref 8.9–12.7)
POTASSIUM SERPL-SCNC: 4.4 MMOL/L (ref 3.5–5.3)
PR INTERVAL: 144 MS
PR INTERVAL: 146 MS
PR INTERVAL: 146 MS
PR INTERVAL: 148 MS
PR INTERVAL: 148 MS
PR INTERVAL: 150 MS
QRS AXIS: -19 DEGREES
QRS AXIS: -22 DEGREES
QRS AXIS: -26 DEGREES
QRS AXIS: -26 DEGREES
QRS AXIS: 74 DEGREES
QRS AXIS: 80 DEGREES
QRSD INTERVAL: 80 MS
QRSD INTERVAL: 82 MS
QRSD INTERVAL: 84 MS
QRSD INTERVAL: 88 MS
QT INTERVAL: 350 MS
QT INTERVAL: 360 MS
QT INTERVAL: 386 MS
QT INTERVAL: 394 MS
QT INTERVAL: 396 MS
QT INTERVAL: 396 MS
QTC INTERVAL: 396 MS
QTC INTERVAL: 418 MS
QTC INTERVAL: 439 MS
QTC INTERVAL: 450 MS
QTC INTERVAL: 451 MS
QTC INTERVAL: 469 MS
RA PRESSURE ESTIMATED: 3 MMHG
RBC # BLD AUTO: 4.12 MILLION/UL (ref 3.88–5.62)
RIGHT VENTRICLE ID DIMENSION: 2.8 CM
SL CV LV EF: 60
SL CV PED ECHO LEFT VENTRICLE DIASTOLIC VOLUME (MOD BIPLANE) 2D: 122 ML
SL CV PED ECHO LEFT VENTRICLE SYSTOLIC VOLUME (MOD BIPLANE) 2D: 45 ML
SODIUM SERPL-SCNC: 135 MMOL/L (ref 135–147)
T WAVE AXIS: -15 DEGREES
T WAVE AXIS: -22 DEGREES
T WAVE AXIS: -6 DEGREES
T WAVE AXIS: 34 DEGREES
T WAVE AXIS: 68 DEGREES
T WAVE AXIS: 95 DEGREES
TRICUSPID ANNULAR PLANE SYSTOLIC EXCURSION: 1.8 CM
TRIGL SERPL-MCNC: 70 MG/DL
VENTRICULAR RATE: 100 BPM
VENTRICULAR RATE: 60 BPM
VENTRICULAR RATE: 68 BPM
VENTRICULAR RATE: 74 BPM
VENTRICULAR RATE: 89 BPM
VENTRICULAR RATE: 94 BPM
WBC # BLD AUTO: 9.64 THOUSAND/UL (ref 4.31–10.16)

## 2024-05-24 PROCEDURE — 93010 ELECTROCARDIOGRAM REPORT: CPT | Performed by: INTERNAL MEDICINE

## 2024-05-24 PROCEDURE — 4A023N7 MEASUREMENT OF CARDIAC SAMPLING AND PRESSURE, LEFT HEART, PERCUTANEOUS APPROACH: ICD-10-PCS | Performed by: INTERNAL MEDICINE

## 2024-05-24 PROCEDURE — B2111ZZ FLUOROSCOPY OF MULTIPLE CORONARY ARTERIES USING LOW OSMOLAR CONTRAST: ICD-10-PCS | Performed by: INTERNAL MEDICINE

## 2024-05-24 PROCEDURE — 99232 SBSQ HOSP IP/OBS MODERATE 35: CPT | Performed by: HOSPITALIST

## 2024-05-24 PROCEDURE — 85025 COMPLETE CBC W/AUTO DIFF WBC: CPT

## 2024-05-24 PROCEDURE — 99152 MOD SED SAME PHYS/QHP 5/>YRS: CPT | Performed by: INTERNAL MEDICINE

## 2024-05-24 PROCEDURE — 93458 L HRT ARTERY/VENTRICLE ANGIO: CPT | Performed by: INTERNAL MEDICINE

## 2024-05-24 PROCEDURE — B2151ZZ FLUOROSCOPY OF LEFT HEART USING LOW OSMOLAR CONTRAST: ICD-10-PCS | Performed by: INTERNAL MEDICINE

## 2024-05-24 PROCEDURE — C1894 INTRO/SHEATH, NON-LASER: HCPCS | Performed by: INTERNAL MEDICINE

## 2024-05-24 PROCEDURE — 93306 TTE W/DOPPLER COMPLETE: CPT

## 2024-05-24 PROCEDURE — C1769 GUIDE WIRE: HCPCS | Performed by: INTERNAL MEDICINE

## 2024-05-24 PROCEDURE — 93306 TTE W/DOPPLER COMPLETE: CPT | Performed by: INTERNAL MEDICINE

## 2024-05-24 PROCEDURE — 99153 MOD SED SAME PHYS/QHP EA: CPT | Performed by: INTERNAL MEDICINE

## 2024-05-24 PROCEDURE — 99223 1ST HOSP IP/OBS HIGH 75: CPT | Performed by: INTERNAL MEDICINE

## 2024-05-24 PROCEDURE — 85730 THROMBOPLASTIN TIME PARTIAL: CPT | Performed by: HOSPITALIST

## 2024-05-24 PROCEDURE — 80048 BASIC METABOLIC PNL TOTAL CA: CPT

## 2024-05-24 PROCEDURE — 84100 ASSAY OF PHOSPHORUS: CPT

## 2024-05-24 PROCEDURE — 93005 ELECTROCARDIOGRAM TRACING: CPT

## 2024-05-24 RX ORDER — FENTANYL CITRATE 50 UG/ML
INJECTION, SOLUTION INTRAMUSCULAR; INTRAVENOUS CODE/TRAUMA/SEDATION MEDICATION
Status: DISCONTINUED | OUTPATIENT
Start: 2024-05-24 | End: 2024-05-24 | Stop reason: HOSPADM

## 2024-05-24 RX ORDER — AMLODIPINE BESYLATE 2.5 MG/1
2.5 TABLET ORAL DAILY
Status: DISCONTINUED | OUTPATIENT
Start: 2024-05-25 | End: 2024-05-25 | Stop reason: HOSPADM

## 2024-05-24 RX ORDER — SODIUM CHLORIDE 9 MG/ML
100 INJECTION, SOLUTION INTRAVENOUS CONTINUOUS
Status: DISPENSED | OUTPATIENT
Start: 2024-05-24 | End: 2024-05-24

## 2024-05-24 RX ORDER — VERAPAMIL HYDROCHLORIDE 2.5 MG/ML
INJECTION, SOLUTION INTRAVENOUS CODE/TRAUMA/SEDATION MEDICATION
Status: DISCONTINUED | OUTPATIENT
Start: 2024-05-24 | End: 2024-05-24 | Stop reason: HOSPADM

## 2024-05-24 RX ORDER — NITROGLYCERIN 20 MG/100ML
INJECTION INTRAVENOUS CODE/TRAUMA/SEDATION MEDICATION
Status: DISCONTINUED | OUTPATIENT
Start: 2024-05-24 | End: 2024-05-24 | Stop reason: HOSPADM

## 2024-05-24 RX ORDER — LIDOCAINE HYDROCHLORIDE 10 MG/ML
INJECTION, SOLUTION EPIDURAL; INFILTRATION; INTRACAUDAL; PERINEURAL CODE/TRAUMA/SEDATION MEDICATION
Status: DISCONTINUED | OUTPATIENT
Start: 2024-05-24 | End: 2024-05-24 | Stop reason: HOSPADM

## 2024-05-24 RX ORDER — ATORVASTATIN CALCIUM 80 MG/1
80 TABLET, FILM COATED ORAL
Status: DISCONTINUED | OUTPATIENT
Start: 2024-05-24 | End: 2024-05-25 | Stop reason: HOSPADM

## 2024-05-24 RX ORDER — CALCIUM GLUCONATE 20 MG/ML
1 INJECTION, SOLUTION INTRAVENOUS ONCE
Status: COMPLETED | OUTPATIENT
Start: 2024-05-24 | End: 2024-05-24

## 2024-05-24 RX ORDER — ACETAMINOPHEN 325 MG/1
975 TABLET ORAL ONCE
Status: COMPLETED | OUTPATIENT
Start: 2024-05-24 | End: 2024-05-24

## 2024-05-24 RX ORDER — MIDAZOLAM HYDROCHLORIDE 2 MG/2ML
INJECTION, SOLUTION INTRAMUSCULAR; INTRAVENOUS CODE/TRAUMA/SEDATION MEDICATION
Status: DISCONTINUED | OUTPATIENT
Start: 2024-05-24 | End: 2024-05-24 | Stop reason: HOSPADM

## 2024-05-24 RX ORDER — HEPARIN SODIUM 1000 [USP'U]/ML
INJECTION, SOLUTION INTRAVENOUS; SUBCUTANEOUS CODE/TRAUMA/SEDATION MEDICATION
Status: DISCONTINUED | OUTPATIENT
Start: 2024-05-24 | End: 2024-05-24 | Stop reason: HOSPADM

## 2024-05-24 RX ADMIN — HEPARIN SODIUM 15.1 UNITS/KG/HR: 10000 INJECTION, SOLUTION INTRAVENOUS at 10:38

## 2024-05-24 RX ADMIN — HEPARIN SODIUM 2000 UNITS: 1000 INJECTION INTRAVENOUS; SUBCUTANEOUS at 07:23

## 2024-05-24 RX ADMIN — ATORVASTATIN CALCIUM 80 MG: 80 TABLET, FILM COATED ORAL at 21:04

## 2024-05-24 RX ADMIN — PANTOPRAZOLE SODIUM 40 MG: 40 TABLET, DELAYED RELEASE ORAL at 05:16

## 2024-05-24 RX ADMIN — TICAGRELOR 90 MG: 90 TABLET ORAL at 08:34

## 2024-05-24 RX ADMIN — ASPIRIN 81 MG CHEWABLE TABLET 81 MG: 81 TABLET CHEWABLE at 08:34

## 2024-05-24 RX ADMIN — CALCIUM GLUCONATE 1 G: 20 INJECTION, SOLUTION INTRAVENOUS at 10:07

## 2024-05-24 RX ADMIN — SODIUM CHLORIDE 100 ML/HR: 0.9 INJECTION, SOLUTION INTRAVENOUS at 15:53

## 2024-05-24 RX ADMIN — ACETAMINOPHEN 975 MG: 325 TABLET, FILM COATED ORAL at 17:02

## 2024-05-24 NOTE — CONSULTS
Consultation - Cardiology Team One  Cecil Hall 63 y.o. male MRN: [id number]  Unit/Bed#: -01 Encounter: [id number]    Inpatient consult to Cardiology  Consult performed by: ROBEL Walls  Consult ordered by: ROBEL Avila      Physician Requesting Consult: Rudi Eason MD  Reason for Consult / Principal Problem: chest pain    Assessment    Chest pain with elevated troponin  Presented with 3 weeks of exertional chest discomfort, usually only lasting 2-3 minutes before resolving. Yesterday he was moving heavy metal grates at work and developed severe burning chest pain that would not go away. He had trouble catching his breath due to the pain but no other cardiac symptoms.  Hs troponin: 6-->115-->433-->343  ECGs reviewed: initial ECG showed inferior ST elevations (III and aVF as well as V1). ST abnormalities resolved after [facility] NTG given and CP had also resolved  Currently symptom free  Transferred from [facility] to [facility] for cardiac catheterization  On IV heparin, ASA, ticagrelor, and statin    Dyslipidemia- , HDL 35, .  Started on atorvastatin 40 mg daily    Tobacco abuse- smoking 2 ppd    Plan  N.p.o. for cardiac catheterization later today to define coronary anatomy  He did have a similar presentation 4 years ago with normal coronaries on cardiac cath.  He tells me that this presentation is slightly different with the development of exertional symptoms which he did not have last time.  Follow-up echocardiogram results  Continue current medical therapy with aspirin, ticagrelor, IV heparin, statin  Continue to monitor on telemetry  Further recommendations pending results of cardiac catheterization     History of Present Illness   HPI: Cecil Hall is a 63 y.o. year old male with tobacco abuse, dyslipidemia, and GERD who presented to the ED at [facility] on 5/23/24 with c/o intermittent exertional chest pain for 3 weeks.  Yesterday, he was moving heavy metal crates at work  when he developed retrosternal burning chest pain that made it difficult to breathe.  The pain persisted and so he came to the ED for further evaluation. Initial ECG showed inferior ST elevations. Interventional cardiologist contacted who recommended  nitroglycerin and repeat ECG in a few minutes. This was recommended due to history of similar presentation back in 2020 with cardiac cath revealing patent coronary arteries. His ST elevations did eventually resolve as did his chest pain after being given the sublingual nitroglycerin.  He is being treated medically with ASA, ticagrelor, IV heparin, and statin. He was transferred to [facility] for cardiac catheterization.     Initial troponin 6-->115-->433-->343. BP well controlled and maintaining adequate O2 sats on room air. Previous echo showed normal LV function and no valvular abnormalities.     EKG reviewed personally: 5/23/24: NSR with significant ST/T wave abnormality including ST elevations in III, aVF and V1  5/24/24: NSR with nonspecific T wave abnormality    Telemetry reviewed personally: NSR    Review of Systems   Constitutional: Negative for chills, malaise/fatigue and weight gain.   Cardiovascular:  Positive for chest pain. Negative for dyspnea on exertion, leg swelling, orthopnea, palpitations and syncope.   Respiratory:  Negative for cough, shortness of breath, sleep disturbances due to breathing and sputum production.    Endocrine: Negative.    Hematologic/Lymphatic: Negative.    Gastrointestinal:  Negative for bloating and nausea.   Genitourinary: Negative.    Neurological:  Negative for dizziness, light-headedness and weakness.   Psychiatric/Behavioral:  Negative for altered mental status.    All other systems reviewed and are negative.    Historical Information   Past Medical History:   Diagnosis Date    GERD (gastroesophageal reflux disease)     Pneumohemothorax      Past Surgical History:   Procedure Laterality Date    HERNIA REPAIR       Social History      Substance and Sexual Activity   Alcohol Use None     Social History     Substance and Sexual Activity   Drug Use Not on file     Social History     Tobacco Use   Smoking Status Every Day    Current packs/day: 0.50    Types: Cigarettes   Smokeless Tobacco Never     Family History: No family history on file.    Meds/Allergies   all current active meds have been reviewed and current meds:   Current Facility-Administered Medications   Medication Dose Route Frequency    aspirin chewable tablet 81 mg  81 mg Oral Daily    atorvastatin (LIPITOR) tablet 40 mg  40 mg Oral HS    heparin (porcine) 25,000 units in 0.45% NaCl 250 mL infusion (premix)  3-20 Units/kg/hr (Order-Specific) Intravenous Titrated    heparin (porcine) injection 2,000 Units  2,000 Units Intravenous Q6H PRN    heparin (porcine) injection 4,000 Units  4,000 Units Intravenous Q6H PRN    nicotine (NICODERM CQ) 21 mg/24 hr TD 24 hr patch 21 mg  21 mg Transdermal Daily PRN    nitroglycerin (NITROSTAT) SL tablet 0.4 mg  0.4 mg Sublingual Q5 Min PRN    pantoprazole (PROTONIX) EC tablet 40 mg  40 mg Oral Early Morning    ticagrelor (BRILINTA) tablet 90 mg  90 mg Oral Q12H CONNIE     heparin (porcine), 3-20 Units/kg/hr (Order-Specific), Last Rate: 15.1 Units/kg/hr (24 0724)        No Known Allergies    Objective   Vitals: Blood pressure 108/67, pulse 85, temperature 97.9 °F (36.6 °C), temperature source Oral, resp. rate 19, height 6' (1.829 m), weight 85 kg (187 lb 6.3 oz), SpO2 94%., Body mass index is 25.41 kg/m².,     Systolic (24hrs), Av , Min:102 , Max:163     Diastolic (24hrs), Av, Min:58, Max:88        Intake/Output Summary (Last 24 hours) at 2024 0944  Last data filed at 2024 0801  Gross per 24 hour   Intake 268 ml   Output 550 ml   Net -282 ml     Wt Readings from Last 3 Encounters:   24 85 kg (187 lb 6.3 oz)   24 90.9 kg (200 lb 6.4 oz)   23 95.3 kg (210 lb)     Invasive Devices       Peripheral Intravenous Line   Duration             Peripheral IV 05/23/24 Left Antecubital <1 day    Peripheral IV 05/23/24 Right Antecubital <1 day                    Physical Exam  Vitals reviewed.   Constitutional:       General: He is not in acute distress.  Neck:      Vascular: No hepatojugular reflux or JVD.   Cardiovascular:      Rate and Rhythm: Normal rate and regular rhythm.      Pulses: Normal pulses.      Heart sounds: Normal heart sounds. No murmur heard.     No friction rub. No gallop.   Pulmonary:      Effort: Pulmonary effort is normal. No respiratory distress.      Breath sounds: No rales.   Abdominal:      General: Bowel sounds are normal. There is no distension.      Palpations: Abdomen is soft.      Tenderness: There is no abdominal tenderness.   Musculoskeletal:         General: No tenderness. Normal range of motion.      Cervical back: Neck supple.      Right lower leg: No edema.      Left lower leg: No edema.   Skin:     General: Skin is warm and dry.      Capillary Refill: Capillary refill takes less than 2 seconds.      Findings: No erythema.   Neurological:      Mental Status: He is alert and oriented to person, place, and time.   Psychiatric:         Mood and Affect: Mood normal.         LABORATORY RESULTS:      CBC with diff:   Results from last 7 days   Lab Units 05/24/24  0434 05/23/24 2221 05/23/24  1409   WBC Thousand/uL 9.64 12.85* 11.26*   HEMOGLOBIN g/dL 13.7 14.6 14.9   HEMATOCRIT % 39.5 42.3 42.7   MCV fL 96 96 93   PLATELETS Thousands/uL 218 232 245   RBC Million/uL 4.12 4.42 4.59   MCH pg 33.3 33.0 32.5   MCHC g/dL 34.7 34.5 34.9   RDW % 12.7 12.5 12.1   MPV fL 10.2 9.8 10.1   NRBC AUTO /100 WBCs 0  --  0       CMP:  Results from last 7 days   Lab Units 05/24/24  0434 05/23/24 2221 05/23/24  1409   POTASSIUM mmol/L 4.4 4.5 3.4*   CHLORIDE mmol/L 102 101 98   CO2 mmol/L 23 24 20*   BUN mg/dL 9 9 11   CREATININE mg/dL 0.84 0.89 1.12   CALCIUM mg/dL 8.9 9.2 9.9   AST U/L  --   --  18   ALT U/L  --   --  12  "  ALK PHOS U/L  --   --  59   EGFR ml/min/1.73sq m 93 91 70       BMP:  Results from last 7 days   Lab Units 24  0434 24  1409   POTASSIUM mmol/L 4.4 4.5 3.4*   CHLORIDE mmol/L 102 101 98   CO2 mmol/L  20*   BUN mg/dL 9 9 11   CREATININE mg/dL 0.84 0.89 1.12   CALCIUM mg/dL 8.9 9.2 9.9       Lab Results   Component Value Date    CREATININE 0.84 2024    CREATININE 0.89 2024    CREATININE 1.12 2024       No results found for: \"NTBNP\"       Results from last 7 days   Lab Units 24  22224  1409   MAGNESIUM mg/dL 2.2 1.9          Results from last 7 days   Lab Units 24   HEMOGLOBIN A1C % 5.5              Results from last 7 days   Lab Units 24  22224  1610   INR  0.98 0.92     Lipid Profile:   Lab Results   Component Value Date    CHOL 149 2014     Lab Results   Component Value Date    HDL 35 (L) 2024    HDL 45 2020    HDL 38 2014     Lab Results   Component Value Date    LDLCALC 120 (H) 2024    LDLCALC 137 (H) 2020    LDLCALC 81 2014     Lab Results   Component Value Date    TRIG 70 2024    TRIG 75 2020    TRIG 152 2014       Cardiac testing:   Results for orders placed during the hospital encounter of 20    Echo complete with contrast if indicated    Narrative  [address] [address] [phone]    Transthoracic Echocardiogram  2D, M-mode, Doppler, and Color Doppler    Study date:  04-Dec-2020    Patient: TEJAS LOYD  MR number: [id number]  Account number: [id number]  : 1960  Age: 60 years  Gender: Male  Status: Outpatient  Location: Echo lab  Height: 72 in  Weight: 203.5 lb  BP: 126/ 56 mmHg    Indications: R/O NSTEMI    Diagnoses: R07.9 - Chest pain, unspecified    Sonographer:  Jackie Ken RDCS  Primary Physician:  Raza Szymanski PA-C  Referring Physician:  Brian Roldan MD  Group:  [place] " [facility]  Interpreting Physician:  Antonio Escalante MD    SUMMARY    LEFT VENTRICLE:  Size was normal.  Systolic function was normal. Ejection fraction was estimated to be 60 %.  There were no regional wall motion abnormalities.  Wall thickness was normal.  There was no evidence of concentric hypertrophy.    MITRAL VALVE:  There was mild regurgitation.    TRICUSPID VALVE:  There was trace regurgitation.    HISTORY: PRIOR HISTORY: chest pain, gerd, hyperlipidemia, patent coronary arteries on cardiac catheterization, current smoker    PROCEDURE: The procedure was performed in the echo lab. This was a routine study. The transthoracic approach was used. The study included complete 2D imaging, M-mode, complete spectral Doppler, and color Doppler. Images were obtained from  the parasternal, apical, subcostal, and suprasternal notch acoustic windows. Image quality was adequate.    LEFT VENTRICLE: Size was normal. Systolic function was normal. Ejection fraction was estimated to be 60 %. There were no regional wall motion abnormalities. Wall thickness was normal. There was no evidence of concentric hypertrophy.    RIGHT VENTRICLE: The size was normal. Systolic function was normal. Wall thickness was normal.    LEFT ATRIUM: Size was normal.    RIGHT ATRIUM: Size was normal.    MITRAL VALVE: Valve structure was normal. There was normal leaflet separation. DOPPLER: The transmitral velocity was within the normal range. There was no evidence for stenosis. There was mild regurgitation.    AORTIC VALVE: The valve was trileaflet. Leaflets exhibited normal thickness and normal cuspal separation. DOPPLER: Transaortic velocity was within the normal range. There was no evidence for stenosis. There was no regurgitation.    TRICUSPID VALVE: The valve structure was normal. There was normal leaflet separation. DOPPLER: The transtricuspid velocity was within the normal range. There was no evidence for stenosis. There was  trace regurgitation.    PULMONIC VALVE: Leaflets exhibited normal thickness, no calcification, and normal cuspal separation. DOPPLER: The transpulmonic velocity was within the normal range. There was no regurgitation.    PERICARDIUM: There was no pericardial effusion. The pericardium was normal in appearance.    AORTA: The root exhibited normal size.    SYSTEM MEASUREMENT TABLES    2D  %FS: 30.11 %  Ao Diam: 2.97 cm  EDV(Teich): 91.95 ml  EF(Teich): 57.53 %  ESV(Teich): 39.05 ml  IVSd: 1.16 cm  LA Area: 14.49 cm2  LA Diam: 3.32 cm  LVEDV MOD A4C: 87.57 ml  LVEF MOD A4C: 62.95 %  LVESV MOD A4C: 32.44 ml  LVIDd: 4.49 cm  LVIDs: 3.14 cm  LVLd A4C: 6.83 cm  LVLs A4C: 5.65 cm  LVPWd: 0.98 cm  RA Area: 11.69 cm2  RVIDd: 3 cm  RWT: 0.44  SV MOD A4C: 55.13 ml  SV(Teich): 52.89 ml    CW  AV Vmax: 0.95 m/s  AV maxPG: 3.61 mmHg  PV Vmax: 0.93 m/s  PV maxPG: 3.44 mmHg  TR Vmax: 2.24 m/s  TR maxP.1 mmHg    MM  TAPSE: 2.05 cm    PW  E' Lat: 0.11 m/s  E' Sept: 0.09 m/s  E/E' Lat: 6.25  E/E' Sept: 7.83  LVOT Vmax: 0.73 m/s  LVOT maxP.15 mmHg  MV A [place]: 0.5 m/s  MV Dec [place]: 3.95 m/s2  MV DecT: 180.94 ms  MV E [place]: 0.72 m/s  MV E/A Ratio: 1.44  RVOT Vmax: 0.7 m/s  RVOT maxP.95 mmHg    [employer] Accredited Echocardiography Laboratory    Prepared and electronically signed by    Antonio Escalante MD  Signed 04-Dec-2020 09:56:57    Imaging: I have personally reviewed pertinent reports.   and I have personally reviewed pertinent films in PACS  XR chest 1 view portable    Result Date: 2024  Narrative: CHEST INDICATION:   chest pain. COMPARISON: 12/10/2014 EXAM PERFORMED/VIEWS:  XR CHEST PORTABLE FINDINGS: Lung volumes are within normal limits. Lungs are clear. No effusion or pneumothorax. Heart, mediastinal and hilar structures are within normal limits. No acute osseous or soft tissue pathology.     Impression: No acute cardiopulmonary findings. Workstation performed: [id number]     CTA dissection protocol  chest/abdomen/pelvis    Result Date: 5/23/2024  Narrative: CT ANGIOGRAM OF THE CHEST,  ABDOMEN AND PELVIS WITH AND WITHOUT IV CONTRAST INDICATION:   chest pain, abd pain, eval for dissection. Intermittent epigastric region pain for a few weeks. Gastroesophageal reflux and burping. History of myocardial infarction, smoking and hernia repair. COMPARISON: 11/24/2020 TECHNIQUE:  CT angiogram examination of the chest, abdomen and pelvis was performed according to standard protocol.  This examination, like all CT scans performed in the [facility], was performed utilizing techniques to minimize radiation dose exposure, including the use of iterative reconstruction and automated exposure control.   Contrast as well as noncontrast images were obtained.  3D reconstructions were performed an independent workstation, and are supplied for review.  Rad dose 1373.3 mGy-cm IV Contrast:  100 mL of iohexol (OMNIPAQUE) Enteric Contrast:  Not given. FINDINGS: VASCULAR STRUCTURES: Normal aortic caliber and enhancement. No intramural hematoma. Mild atherosclerosis. Chest: Patent aortic arch branch vessels and visualized vertebral arteries. Abdomen: Patent celiac artery and branches, superior and inferior mesenteric and renal arteries. Accessory right lower pole renal artery. Pelvis: Bilateral common, internal and external iliac arteries and visualized femoral arteries are patent. Bilateral, chronic common iliac atherosclerosis with ulcerative plaque. No penetrating ulcers or dissection. OTHER FINDINGS CHEST LUNGS: Dependent atelectasis. Patent central airways. Chronic emphysema and left perifissural benign intrapulmonary lymph node. Calcified right middle lobe granuloma. PLEURA:  Within normal limits. HEART/PULMONARY ARTERIAL TREE: Normal heart size.  Patent central pulmonary arteries. MEDIASTINUM AND MISHA: Similar mild gynecomastia. CHEST WALL AND LOWER NECK:   Within normal limits. ABDOMEN LIVER/BILIARY TREE: Small,  benign likely granulomatous solitary calcification. Otherwise within normal limits. GALLBLADDER: SPLEEN:  Within normal limits. PANCREAS:  Within normal limits. ADRENAL GLANDS:  Within normal limits. KIDNEYS/URETERS:  Within normal limits. STOMACH AND VISUALIZED BOWEL:  Within normal limits. APPENDIX: Within normal limits. ABDOMINAL  CAVITY:  No abnormal air, fluid or enlarged lymph nodes. PELVIS: REPRODUCTIVE: Within normal limits for age. BLADDER:  Within normal limits. ABDOMINAL/PELVIC WALL/INGUINAL REGIONS: Within normal limits. BONES:  Degenerative changes.     Impression: No acute vascular pathology. 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 No acute findings in the chest, abdomen or pelvis. Nonemergent findings above. Workstation performed: [id number]     Counseling / Coordination of Care  Total floor / unit time spent today 45 minutes.  Greater than 50% of total time was spent with the patient and / or family counseling and / or coordination of care.  A description of the counseling / coordination of care: Review of history, current assessment, development of a plan.    Code Status: Level 1 - Full Code    ** Please Note: Dragon 360 Dictation voice to text software may have been used in the creation of this document. **

## 2024-05-24 NOTE — PLAN OF CARE
Problem: PAIN - ADULT  Goal: Verbalizes/displays adequate comfort level or baseline comfort level  Description: Interventions:  - Encourage patient to monitor pain and request assistance  - Assess pain using appropriate pain scale  - Administer analgesics based on type and severity of pain and evaluate response  - Implement non-pharmacological measures as appropriate and evaluate response  - Consider cultural and social influences on pain and pain management  - Notify physician/advanced practitioner if interventions unsuccessful or patient reports new pain  Outcome: Progressing     Problem: INFECTION - ADULT  Goal: Absence or prevention of progression during hospitalization  Description: INTERVENTIONS:  - Assess and monitor for signs and symptoms of infection  - Monitor lab/diagnostic results  - Monitor all insertion sites, i.e. indwelling lines, tubes, and drains  - Monitor endotracheal if appropriate and nasal secretions for changes in amount and color  - [place] appropriate cooling/warming therapies per order  - Administer medications as ordered  - Instruct and encourage patient and family to use good hand hygiene technique  - Identify and instruct in appropriate isolation precautions for identified infection/condition  Outcome: Progressing

## 2024-05-24 NOTE — PROGRESS NOTES
INTERNAL MEDICINE RESIDENCY SENIOR ADMISSION NOTE     Name: Cecil Hall   Age & Sex: 63 y.o. male   MRN: [id number]  Unit/Bed#: -01   Encounter: [id number]  Primary Care Provider: Lian Garcia,     Admit to team: SOD Team A    Patient seen and examined. Reviewed H&P per Dr. Garcia . Agree with the assessment and plan with any exception/addition as noted below:    Active Problems:    Tobacco abuse    Gastroesophageal reflux disease without esophagitis    Chest pain      63-year-old male past medical history of hypertension, hyperlipidemia, GERD, presented to [facility] 5/23/2024 with concerns of epigastric/mid chest pain.    Initial EKG was negative for STEMI and his chest pain initially improved.  CTA dissection protocol showed mild atherosclerosis but no dissection/aneurysm.     He did have recurrent chest pain with EKG showing possible STEMI.  Interventional cardiology was consulted at that time and recommended sublingual nitroglycerin with aspirin, ticagrelor load, heparin drip and transferred to [facility] for left heart cath.    Patient seen bedside, continues to have intermittent epigastric/substernal chest pain without radiation.  He states it is worse with exertion, improves with rest.  Leaning backwards does improve his pain, leaning forwards does exacerbate his pain.  Nitro does help with his pain.  No other complaints.  Rarely drinks alcohol.  Smokes roughly 2 packs a day.  Denies any marijuana/drug use    Plan:  Continue aspirin and ticagrelor, heparin drip  Start statin  Nicotine patch  Check CBC, BMP, Troponin, A1c lipid panel  NPO midnight  Likely [facility] tomorrow      Code Status: Level 1 - Full Code  Admission Status: INPATIENT   Disposition: Patient requires Med/Surg Tele  Expected Length of Stay: > 2 midnights

## 2024-05-24 NOTE — UTILIZATION REVIEW
Initial Clinical Review    Admission: Date/Time/Statement:   Admission Orders (From admission, onward)       Ordered        05/23/24 2207  Inpatient Admission  Once                          Orders Placed This Encounter   Procedures    Inpatient Admission     Standing Status:   Standing     Number of Occurrences:   1     Order Specific Question:   Level of Care     Answer:   Med Surg [16]     Comments:   with tele     Order Specific Question:   Estimated length of stay     Answer:   More than 2 Midnights     Order Specific Question:   Certification     Answer:   I certify that inpatient services are medically necessary for this patient for a duration of greater than two midnights. See H&P and MD Progress Notes for additional information about the patient's course of treatment.     Initial Presentation: 63 y.o. male with PMHx:  GERD and previous NSTEMI, who presented initially to [facility] then transferred to [facility], admitted Inpatient status dt Chest pain.   Presented due to intemittant chest pain that improved with no intervention and at that time EKG was negative for STEMI. However, he then developed crushing chest pain and EKG did have ST elevations. CP and ST elevations resolved with sublingal nitro. Cardiology recommened nonurgent transfer to [facility] for left heart cath on 5/24. In [place] ED, he was treated with ASA, loaded with ticagrelol, heparin bolused and gtt.  Plan:  Admit to med surg telemetry, Cardiology consult, NPO after MN, heparin infusion, nitro SL PRN, trend trops, continue ASA and brillanta, protonix, order nicotine patch.      Date: 5/24   Day 2: No new complaints today. Denies chest pain and shortness of breath. Plan for cardiac cath today. Continue heparin, ASA, brillinta, nitro SL PRN, fu on echo, continue telemetry, PPI.     5/24 Per Cardiology: NSTEMI: presented with a significant worsening exertional symptoms of angina and was found to have significant ECG changes, and  worse ECG changes compared to his last event in 2020. He has a mildly elevated troponin. Plan is cardiac catheterization at this point. LV function preserved. Continue IV heparin, dual antiplatelet therapy and statin. He will need to work on smoking cessation.     ED Triage Vitals   Temperature Pulse Respirations Blood Pressure SpO2   05/23/24 2159 05/23/24 2159 05/23/24 2159 05/23/24 2159 05/23/24 2159   97.5 °F (36.4 °C) 70 15 110/77 96 %      Temp Source Heart Rate Source Patient Position - Orthostatic VS BP Location FiO2 (%)   05/23/24 2159 05/23/24 2159 05/23/24 2159 05/23/24 2159 --   Oral Monitor Sitting Left arm       Pain Score       05/23/24 2206       No Pain          Wt Readings from Last 1 Encounters:   05/24/24 84.8 kg (187 lb)     Additional Vital Signs:   Date/Time Temp Pulse Resp BP MAP (mmHg) SpO2 O2 Device Patient Position - Orthostatic VS   05/24/24 0958 -- 85 -- 108/67 -- -- -- --   05/24/24 07:04:09 97.9 °F (36.6 °C) 85 19 108/67 81 94 % None (Room air) Lying   05/24/24 03:17:13 97.8 °F (36.6 °C) 77 15 115/65 82 93 % None (Room air) Lying   05/23/24 23:59:43 97.2 °F (36.2 °C) Abnormal  64 16 113/75 88 95 % -- --   05/23/24 2306 -- 69 -- -- -- -- -- --   05/23/24 2206 -- -- -- -- -- -- None (Room air) --   05/23/24 22:02:06 97.5 °F (36.4 °C) 82 -- 110/77 88 96 % -- --   05/23/24 21:59:14 97.5 °F (36.4 °C) 70 15 110/77 88 96 % -- Sitting     Pertinent Labs/Diagnostic Test Results:   5/24 Cardiac Cath:  Impression    No angiographic evidence of signficant obstructive CAD, but there is a moderate focal LAD lesion warranting med mgmt    LVEDP is normal without gradient on LV-AO pullback    Prox LAD to Mid LAD lesion is 45% stenosed.  Recommendation  Recommendation Cont GDMT optimization for CAD as well as Empiric therapy for Vasospasm  Cont risk factor reduction   on diet/lifestyle modification   on smoking cessation, referral to  Smoking Cessation program  Cardiac rehab upon  discharge     5/24 ECHO pending    Results from last 7 days   Lab Units 05/24/24 0434 05/23/24 2221 05/23/24  1409   WBC Thousand/uL 9.64 12.85* 11.26*   HEMOGLOBIN g/dL 13.7 14.6 14.9   HEMATOCRIT % 39.5 42.3 42.7   PLATELETS Thousands/uL 218 232 245   TOTAL NEUT ABS Thousands/µL 5.13  --  4.03         Results from last 7 days   Lab Units 05/24/24 0434 05/23/24 2221 05/23/24  1409   SODIUM mmol/L 135 134* 133*   POTASSIUM mmol/L 4.4 4.5 3.4*   CHLORIDE mmol/L 102 101 98   CO2 mmol/L 23 24 20*   ANION GAP mmol/L 10 9 15*   BUN mg/dL 9 9 11   CREATININE mg/dL 0.84 0.89 1.12   EGFR ml/min/1.73sq m 93 91 70   CALCIUM mg/dL 8.9 9.2 9.9   CALCIUM, IONIZED mmol/L  --  1.10*  --    MAGNESIUM mg/dL  --  2.2 1.9   PHOSPHORUS mg/dL 2.3 2.2*  --      Results from last 7 days   Lab Units 05/23/24  1409   AST U/L 18   ALT U/L 12   ALK PHOS U/L 59   TOTAL PROTEIN g/dL 7.7   ALBUMIN g/dL 4.4   TOTAL BILIRUBIN mg/dL 0.62         Results from last 7 days   Lab Units 05/24/24  0434 05/23/24 2221 05/23/24  1409   GLUCOSE RANDOM mg/dL 107 119 138         Results from last 7 days   Lab Units 05/23/24  2221   HEMOGLOBIN A1C % 5.5   EAG mg/dl 111     Results from last 7 days   Lab Units 05/23/24 2221 05/23/24  1829 05/23/24  1604 05/23/24  1409   HS TNI 0HR ng/L 343*  --   --  6   HS TNI 2HR ng/L  --   --  115*  --    HSTNI D2 ng/L  --   --  109*  --    HS TNI 4HR ng/L  --  433*  --   --    HSTNI D4 ng/L  --  427*  --   --      Results from last 7 days   Lab Units 05/23/24  1409   D-DIMER QUANTITATIVE ug/ml FEU 0.60*     Results from last 7 days   Lab Units 05/24/24  0524 05/23/24  2221 05/23/24  1610   PROTIME seconds  --  12.9 12.3   INR   --  0.98 0.92   PTT seconds 57* 43* 25     Results from last 7 days   Lab Units 05/23/24  1409   BNP pg/mL 17     Results from last 7 days   Lab Units 05/23/24  1409   LIPASE u/L 24       Past Medical History:   Diagnosis Date    GERD (gastroesophageal reflux disease)     Pneumohemothorax       Present on Admission:   Tobacco abuse   Gastroesophageal reflux disease without esophagitis      Admitting Diagnosis: Chest pain [R07.9]  Age/Sex: 63 y.o. male  Admission Orders:  Scheduled Medications:  aspirin, 81 mg, Oral, Daily  atorvastatin, 40 mg, Oral, HS  calcium gluconate, 1 g, Intravenous, Once  pantoprazole, 40 mg, Oral, Early Morning  ticagrelor, 90 mg, Oral, Q12H CONNIE      Continuous IV Infusions:  heparin (porcine), 3-20 Units/kg/hr (Order-Specific), Intravenous, Titrated      PRN Meds:  heparin (porcine), 2,000 Units, Intravenous, Q6H PRN  heparin (porcine), 4,000 Units, Intravenous, Q6H PRN  nicotine, 21 mg, Transdermal, Daily PRN  nitroglycerin, 0.4 mg, Sublingual, Q5 Min PRN        IP CONSULT TO CARDIOLOGY    Network Utilization Review Department  ATTENTION: Please call with any questions or concerns to [phone] and carefully listen to the prompts so that you are directed to the right person. All voicemails are confidential.   For Discharge needs, contact Care Management DC Support Team at [phone] opt. [phone]  Send all requests for admission clinical reviews, approved or denied determinations and any other requests to dedicated fax number below belonging to the campus where the patient is receiving treatment. List of dedicated fax numbers for the Facilities:  FACILITY NAME UR FAX NUMBER   ADMISSION DENIALS (Administrative/Medical Necessity) [phone]   DISCHARGE SUPPORT TEAM (NETWORK) [phone]   PARENT CHILD HEALTH (Maternity/NICU/Pediatrics) [phone]   [facility] [phone]   [facility] [phone]   [facility] [phone]   [facility] [phone]   [facility] [phone]   [facility] [phone]   [facility] [phone]   [facility] [phone]   [facility] [phone]   [facility] [phone]   [facility] [phone]   [facility] [phone]

## 2024-05-24 NOTE — ASSESSMENT & PLAN NOTE
Continues to smoke 2 pack per day. Has been urged to quit several times, is motivated to quit but wife still smokes. Discussed that best time to quit is post hospitalization.     Plan:  Nicotine patch  Nicotine lozenges PRN

## 2024-05-24 NOTE — QUICK NOTE
Called patients wife, updating them about ongoing treatment plan and any patient updates.      Gianfranco Olmedo, MS3

## 2024-05-24 NOTE — ASSESSMENT & PLAN NOTE
History of GERD, however this may have been atypical/referred angina this whole time. Is on daily prilosec at home.     Plan:  Daily Protonix

## 2024-05-24 NOTE — DISCHARGE INSTR - AVS FIRST PAGE
1. Please see the post cardiac catheterization dishcarge instructions.   No heavy lifting, greater than 10 lbs. or strenuous  activity for 48 hrs.    2.Remove band aid tomorrow.  Shower and wash area- wrist gently with soap and water- beginning tomorrow. Rinse and pat dry.  Apply new water seal band aid.  Repeat this process for 5 days. No powders, creams lotions or antibiotic ointments  for 5 days.  No tub baths, hot tubs or swimming for 5 days.     3. Please call our office ([phone]) if you have any fever, redness, swelling, discharge from your wrist access site.    4.No driving for 1 day        Please start taking your amlodipine 2.5 mg tablet  Please use sublingual nitrates as needed for chest pain as prescribed  Please follow-up outpatient with your primary care physician within 1 week  Smoking cessation is very essential

## 2024-05-24 NOTE — ED NOTES
Patient reports crushing chest pain at this time. Provider aware.      Jolie Torres  05/23/24 2017

## 2024-05-24 NOTE — PROGRESS NOTES
INTERNAL MEDICINE RESIDENCY PROGRESS NOTE     Name: Cecil Hall   Age & Sex: 63 y.o. male   MRN: [id number]  Unit/Bed#: -01   Encounter: [id number]  Team: SOD Team A    PATIENT INFORMATION     Name: Cecil Hall   Age & Sex: 63 y.o. male   MRN: [id number]  Hospital Stay Days: 1    ASSESSMENT/PLAN     Principal Problem:    Chest pain  Active Problems:    Tobacco abuse    Gastroesophageal reflux disease without esophagitis      * Chest pain  Assessment & Plan  On initial presentation to [place], he had intemittant CP that improved with no intervention and at that time EKG was negative for STEMI. However, he then developed crushing CP and EKG did have ST elevations. CP and ST elevations resolved with sublingal nitro. Cardiology recommened nonurgent [facility] for [facility] heart [facility] on 5/24. In [place] ED, he was treated with ASA, loaded with ticagrelol, heparin bolused and gtt.     Trops: 6 > 115 > 433 > 343    Plan:  [facility] today 5/24  Low ACS heparin   SL nitro PRN   Trend trops   Continue ASA  Continue brilinta    Cardiology consulted  Follow-up echocardiogram results  Continue current medical therapy with aspirin, ticagrelor, IV heparin, statin  Continue to monitor on telemetry  Further recommendations pending results of cardiac catheterization     Gastroesophageal reflux disease without esophagitis  Assessment & Plan  History of GERD, however this may have been atypical/referred angina this whole time. Is on daily prilosec at home.     Plan:  Daily Protonix     Tobacco abuse  Assessment & Plan  Continues to smoke 2 pack per day. Has been urged to quit several times, is motivated to quit but wife still smokes. Discussed that best time to quit is post hospitalization.     Plan:  Nicotine patch  Nicotine lozenges PRN        Disposition: [facility] today, possible dc tomorrow pending results.      SUBJECTIVE     Patient seen and examined. No acute events overnight. Overall he is feeling okay. He is not experiencing  any chest pain, shortness of breath, or abdominal pain currently. Denies fevers, chills, lower extremity swelling, or changes with urination.     OBJECTIVE     Vitals:    24 0704 24 0815 24 0958 24 1057   BP: 108/67  108/67 113/70   BP Location: Left arm      Pulse: 85  85 72   Resp: 19   18   Temp: 97.9 °F (36.6 °C)   97.7 °F (36.5 °C)   TempSrc: Oral      SpO2: 94% 97%  97%   Weight:   84.8 kg (187 lb)    Height:   6' (1.829 m)       Temperature:   Temp (24hrs), Av.6 °F (36.4 °C), Min:97.2 °F (36.2 °C), Max:97.9 °F (36.6 °C)    Temperature: 97.7 °F (36.5 °C)  Intake & Output:  I/O          0701   0700  0701   0700  0701   0700    P.O.  250 0    I.V. (mL/kg)  18 (0.2)     Total Intake(mL/kg)  268 (3.2) 0 (0)    Urine (mL/kg/hr)  250 300 (0.8)    Total Output  250 300    Net  +18 -300                 Weights:   IBW (Ideal Body Weight): 77.6 kg    Body mass index is 25.36 kg/m².  Weight (last 2 days)       Date/Time Weight    24 0958 84.8 (187)    24 0609 85 (187.39)          Physical Exam  Vitals reviewed.   Constitutional:       General: He is not in acute distress.     Appearance: Normal appearance.   HENT:      Head: Normocephalic and atraumatic.      Right Ear: External ear normal.      Left Ear: External ear normal.      Mouth/Throat:      Pharynx: Oropharynx is clear.   Eyes:      Pupils: Pupils are equal, round, and reactive to light.   Cardiovascular:      Rate and Rhythm: Normal rate and regular rhythm.      Heart sounds: Normal heart sounds.   Pulmonary:      Effort: Pulmonary effort is normal. No respiratory distress.      Breath sounds: Normal breath sounds.   Abdominal:      Tenderness: There is no abdominal tenderness.   Musculoskeletal:      Right lower leg: No edema.      Left lower leg: No edema.   Neurological:      General: No focal deficit present.      Mental Status: He is alert and oriented to person, place, and time.    Psychiatric:         Mood and Affect: Mood normal.       LABORATORY DATA     Labs: I have personally reviewed pertinent reports.  Results from last 7 days   Lab Units 05/24/24  0434 05/23/24 2221 05/23/24  1409   WBC Thousand/uL 9.64 12.85* 11.26*   HEMOGLOBIN g/dL 13.7 14.6 14.9   HEMATOCRIT % 39.5 42.3 42.7   PLATELETS Thousands/uL 218 232 245   SEGS PCT % 53  --  36*   MONO PCT % 11  --  11   EOS PCT % 3  --  3      Results from last 7 days   Lab Units 05/24/24  0434 05/23/24 2221 05/23/24  1409   POTASSIUM mmol/L 4.4 4.5 3.4*   CHLORIDE mmol/L 102 101 98   CO2 mmol/L 23 24 20*   BUN mg/dL 9 9 11   CREATININE mg/dL 0.84 0.89 1.12   CALCIUM mg/dL 8.9 9.2 9.9   ALK PHOS U/L  --   --  59   ALT U/L  --   --  12   AST U/L  --   --  18     Results from last 7 days   Lab Units 05/23/24 2221 05/23/24  1409   MAGNESIUM mg/dL 2.2 1.9     Results from last 7 days   Lab Units 05/24/24 0434 05/23/24 2221   PHOSPHORUS mg/dL 2.3 2.2*      Results from last 7 days   Lab Units 05/24/24 0524 05/23/24 2221 05/23/24  1610   INR   --  0.98 0.92   PTT seconds 57* 43* 25               IMAGING & DIAGNOSTIC TESTING     Radiology Results: I have personally reviewed pertinent reports.  XR chest 1 view portable    Result Date: 5/23/2024  Impression: No acute cardiopulmonary findings. Workstation performed: [id number]     CTA dissection protocol chest/abdomen/pelvis    Result Date: 5/23/2024  Impression: No acute vascular pathology. 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 No acute findings in the chest, abdomen or pelvis. Nonemergent findings above. Workstation performed: [id number]     Other Diagnostic Testing: I have personally reviewed pertinent reports.    ACTIVE MEDICATIONS     Current Facility-Administered Medications   Medication Dose Route Frequency    aspirin chewable tablet 81 mg  81 mg Oral Daily    atorvastatin (LIPITOR) tablet 40 mg  40 mg Oral HS    heparin (porcine) 25,000 units in 0.45% NaCl  "250 mL infusion (premix)  3-20 Units/kg/hr (Order-Specific) Intravenous Titrated    heparin (porcine) injection 2,000 Units  2,000 Units Intravenous Q6H PRN    heparin (porcine) injection 4,000 Units  4,000 Units Intravenous Q6H PRN    nicotine (NICODERM CQ) 21 mg/24 hr TD 24 hr patch 21 mg  21 mg Transdermal Daily PRN    nitroglycerin (NITROSTAT) SL tablet 0.4 mg  0.4 mg Sublingual Q5 Min PRN    pantoprazole (PROTONIX) EC tablet 40 mg  40 mg Oral Early Morning    ticagrelor (BRILINTA) tablet 90 mg  90 mg Oral Q12H CONNIE       VTE Pharmacologic Prophylaxis: Heparin  VTE Mechanical Prophylaxis: sequential compression device    Portions of the record may have been created with voice recognition software.  Occasional wrong word or \"sound a like\" substitutions may have occurred due to the inherent limitations of voice recognition software.  Read the chart carefully and recognize, using context, where substitutions have occurred.  ==  Carol Tamayo DO  [facility]  Internal Medicine Residency PGY-1     "

## 2024-05-24 NOTE — H&P
INTERNAL MEDICINE RESIDENCY ADMISSION H&P     Name: Cecil Hall   Age & Sex: 63 y.o. male   MRN: [id number]  Unit/Bed#: -01   Encounter: [id number]  Primary Care Provider: Lian Garcia DO    Code Status: Level 1 - Full Code  Admission Status: INPATIENT   Disposition: Patient requires Med/Surg    Admit to team: SOD Team A    ASSESSMENT/PLAN     Active Problems:    Tobacco abuse    Gastroesophageal reflux disease without esophagitis    Chest pain      Chest pain  Assessment & Plan  On initial presentation to [facility], he had intemittant CP that improved with no intervention and at that time EKG was negative for STEMI. However, he then developed crushing CP and EKG did have ST elevations. CP and ST elevations resolved with sublingal nitro. Cardiology recommened nonurgent transfer to [facility] for left heart cath on 5/24. In [place] ED, he was treated with ASA, loaded with ticagrelol, heparin bolused and gtt.     Trops: 6 > 115 > 433    Plan:  Cardiology consulted, plan for [facility] 5/24  NPO midnight  Low ACS heparin   SL nitro PRN   Trend trops  Continue ASA  Continue brillanta     Gastroesophageal reflux disease without esophagitis  Assessment & Plan  History of GERD, however this may have been atypical/referred angina this whole time. Is on daily prilosec at home.     Plan:  Daily Protonix     Tobacco abuse  Assessment & Plan  Continues to smoke 2 pack per day. Has been urged to quit several times, is motivated to quit but wife still smokes. Discussed that best time to quit is post hospitalization.     Plan:  Nicotine patch  Nicotine lozenges PRN      VTE Pharmacologic Prophylaxis: Heparin  VTE Mechanical Prophylaxis: sequential compression device    CHIEF COMPLAINT   No chief complaint on file.     HISTORY OF PRESENT ILLNESS     This is a 63 y.o. male with a past medical history of GERD and previous NSTEMI, who presents from [facility] on 5/23 for chest pain. On initial presentation to [facility], he had  "intemittant CP that improved with no intervention and at that time EKG was negative for STEMI. However, he then developed crushing CP and EKG did have ST elevations. CP and ST elevations resolved with sublingal nitro. Cardiology recommened nonurgent transfer to [facility] for left heart cath on . In [facility] ED, he was treated with ASA, loaded with ticagrelol, heparin bolused and gtt.     On arrival to [facility], his main concern is his \"reflux\" and that he has been drinking Mylanta \"like a fish\". He states that he has been having bad heartburn that usually remits with rest and Mylanta, however, this morning at work it did not resolve with rest so he came into the ED. He does not follow with a PCP because he does not like doctors or hospitals and \"hasn't had any health problems\". He endorses current tobacco use with attempts to quit. He socially drinks 1-2 beers per week. He denies any SOB, OVALLES, PND, lower extremity swelling, fevers/chills/sweats.     He will be admitted to  for ACS and left heart cath by cardiology.     He confirms that he is level 1 full code. He would appreciate his wife to be updated by the team.     REVIEW OF SYSTEMS     Review of Systems   Constitutional: Negative.    HENT: Negative.     Respiratory: Negative.     Cardiovascular:  Positive for chest pain. Negative for palpitations and leg swelling.   Gastrointestinal:  Positive for abdominal pain.     OBJECTIVE     Vitals:    24 2159 24 2202   BP: 110/77 110/77   Pulse: 70 82   Resp: 15    Temp: 97.5 °F (36.4 °C) 97.5 °F (36.4 °C)   SpO2: 96% 96%      Temperature:   Temp (24hrs), Av.5 °F (36.4 °C), Min:97.5 °F (36.4 °C), Max:97.6 °F (36.4 °C)    Temperature: 97.5 °F (36.4 °C)  Intake & Output:  I/O       None          Weights:        There is no height or weight on file to calculate BMI.  Weight (last 2 days)       None          Physical Exam:  General: No apparent distress, resting comfortably   Head: Normocephalic, " "atraumatic  Eyes: Anicteric, no conjunctival erythema  ENT: External ear normal, no nasal discharge  Neck: Trachea midline, no visible lymphadenopathy or goiter  Respiratory: CTA bilaterally, Non-labored respirations, symmetric thorax expansion  Cardiovascular: RRR, no murmur, Extremities appear well-perfused  Abdomen: Non-distended  Extremities: Moves extremities spontaneously, no peripheral edema  Skin: No visible rashes, wounds, or jaundice  Neuro: A&O x 3, no gross focal deficits, no aphasia     PAST MEDICAL HISTORY     Past Medical History:   Diagnosis Date    GERD (gastroesophageal reflux disease)     Pneumohemothorax      PAST SURGICAL HISTORY     Past Surgical History:   Procedure Laterality Date    HERNIA REPAIR       SOCIAL & FAMILY HISTORY     Social History     Substance and Sexual Activity   Alcohol Use None       Social History     Substance and Sexual Activity   Drug Use Not on file     Social History     Tobacco Use   Smoking Status Every Day    Current packs/day: 0.50    Types: Cigarettes   Smokeless Tobacco Never     No family history on file.  LABORATORY DATA     Labs: I have personally reviewed pertinent reports.    Results from last 7 days   Lab Units 05/23/24 2221 05/23/24  1409   WBC Thousand/uL 12.85* 11.26*   HEMOGLOBIN g/dL 14.6 14.9   HEMATOCRIT % 42.3 42.7   PLATELETS Thousands/uL 232 245   SEGS PCT %  --  36*   MONO PCT %  --  11   EOS PCT %  --  3      Results from last 7 days   Lab Units 05/23/24  1409   POTASSIUM mmol/L 3.4*   CHLORIDE mmol/L 98   CO2 mmol/L 20*   BUN mg/dL 11   CREATININE mg/dL 1.12   CALCIUM mg/dL 9.9   ALK PHOS U/L 59   ALT U/L 12   AST U/L 18     Results from last 7 days   Lab Units 05/23/24  1409   MAGNESIUM mg/dL 1.9          Results from last 7 days   Lab Units 05/23/24  2221 05/23/24  1610   INR  0.98 0.92   PTT seconds 43* 25             Micro:  No results found for: \"BLOODCX\", \"URINECX\", \"WOUNDCULT\", \"SPUTUMCULTUR\"  IMAGING & DIAGNOSTIC TESTS     Imaging: I " have personally reviewed pertinent reports.    XR chest 1 view portable    Result Date: 5/23/2024  Impression: No acute cardiopulmonary findings. Workstation performed: [id number]     CTA dissection protocol chest/abdomen/pelvis    Result Date: 5/23/2024  Impression: No acute vascular pathology. 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 No acute findings in the chest, abdomen or pelvis. Nonemergent findings above. Workstation performed: [id number]     EKG, Pathology, and Other Studies: I have personally reviewed pertinent reports.     ALLERGIES   No Known Allergies  MEDICATIONS PRIOR TO ARRIVAL     Prior to Admission medications    Medication Sig Start Date End Date Taking? Authorizing Provider   aluminum-magnesium hydroxide-simethicone (MYLANTA) 200-200-20 mg/5 mL suspension Take 30 mL by mouth every 2 (two) hours as needed for indigestion or heartburn  Patient not taking: Reported on 5/25/2022 11/25/20   Chip Camejo DO   aspirin (ECOTRIN LOW STRENGTH) 81 mg EC tablet Take 1 tablet (81 mg total) by mouth daily  Patient not taking: Reported on 5/25/2022 11/26/20   Chip Camejo DO   atorvastatin (LIPITOR) 40 mg tablet Take 1 tablet (40 mg total) by mouth daily with dinner  Patient not taking: Reported on 5/25/2022 12/1/20   Brian Roldan MD   metoprolol succinate (TOPROL-XL) 25 mg 24 hr tablet Take 1 tablet (25 mg total) by mouth daily  Patient not taking: Reported on 5/25/2022 11/25/20   Chip Camejo DO   naproxen (Naprosyn) 500 mg tablet Take 1 tablet (500 mg total) by mouth 2 (two) times a day with meals 9/8/23   Vicente Zuniga,    nicotine (NICODERM CQ) 14 mg/24hr TD 24 hr patch Place 1 patch on the skin every 24 hours  Patient not taking: Reported on 5/25/2022 12/16/20   Yoly Park MD   nicotine polacrilex (COMMIT) 4 MG lozenge Apply 1 lozenge (4 mg total) to the mouth or throat as needed for smoking cessation  Patient not taking: Reported on 5/25/2022 12/16/20    Yoly Park MD   nicotine polacrilex (NICORETTE) 4 mg gum Chew 1 each (4 mg total) as needed for smoking cessation  Patient not taking: Reported on 5/25/2022 12/16/20   Yoly Park MD   omeprazole (PriLOSEC) 20 mg delayed release capsule Take 20 mg by mouth daily    Historical Provider, MD     MEDICATIONS ADMINISTERED IN LAST 24 HOURS     Medication Administration - last 24 hours from 05/22/2024 2251 to 05/23/2024 2251         Date/Time Order Dose Route Action Action by     05/23/2024 2222 EDT heparin (porcine) 25,000 units in 0.45% NaCl 250 mL infusion (premix) 11.1 Units/kg/hr Intravenous New Bag Tammy Padilla RN     05/23/2024 2237 EDT atorvastatin (LIPITOR) tablet 40 mg 40 mg Oral Given Tammy Padilla RN     05/23/2024 2237 EDT pantoprazole (PROTONIX) EC tablet 40 mg 40 mg Oral Given Tammy Padilla RN          CURRENT MEDICATIONS     Current Facility-Administered Medications   Medication Dose Route Frequency Provider Last Rate    [START ON 5/24/2024] aspirin  81 mg Oral Daily Carter Beavers MD      atorvastatin  40 mg Oral HS Carter Beavers MD      heparin (porcine)  3-20 Units/kg/hr (Order-Specific) Intravenous Titrated Carter Beavers MD 11.1 Units/kg/hr (05/23/24 2222)    heparin (porcine)  2,000 Units Intravenous Q6H PRN Carter Beavers MD      heparin (porcine)  4,000 Units Intravenous Once Carter Beavers MD      heparin (porcine)  4,000 Units Intravenous Q6H PRN Carter Beavers MD      nicotine  21 mg Transdermal Daily PRN Carter Beavers MD      nitroglycerin  0.4 mg Sublingual Q5 Min PRN Binta Garcia MD      pantoprazole  40 mg Oral Early Morning Carter Beavers MD      [START ON 5/24/2024] ticagrelor  90 mg Oral Q12H CONNIE Carter Beavers MD       heparin (porcine), 3-20 Units/kg/hr (Order-Specific), Last Rate: 11.1 Units/kg/hr (05/23/24 2222)      heparin (porcine), 2,000 Units, Q6H PRN  heparin (porcine), 4,000 Units, Q6H PRN  nicotine, 21 mg, Daily PRN  nitroglycerin, 0.4 mg,  "Q5 Min PRN        Admission Time  I spent 30 minutes admitting the patient.  This involved direct patient contact where I performed a full history and physical, reviewing previous records, and reviewing laboratory and other diagnostic studies.    Portions of the record may have been created with voice recognition software.  Occasional wrong word or \"sound a like\" substitutions may have occurred due to the inherent limitations of voice recognition software.  Read the chart carefully and recognize, using context, where substitutions have occurred.    ==  Binta Garcia MD  [facility]  Internal Medicine Residency PGY-1   "

## 2024-05-25 VITALS
TEMPERATURE: 97.9 F | OXYGEN SATURATION: 100 % | HEIGHT: 72 IN | HEART RATE: 74 BPM | DIASTOLIC BLOOD PRESSURE: 81 MMHG | RESPIRATION RATE: 17 BRPM | BODY MASS INDEX: 25.98 KG/M2 | WEIGHT: 191.8 LBS | SYSTOLIC BLOOD PRESSURE: 134 MMHG

## 2024-05-25 PROBLEM — R07.9 CHEST PAIN: Status: RESOLVED | Noted: 2024-05-23 | Resolved: 2024-05-25

## 2024-05-25 LAB
ANION GAP SERPL CALCULATED.3IONS-SCNC: 8 MMOL/L (ref 4–13)
BASOPHILS # BLD AUTO: 0.06 THOUSANDS/ÂΜL (ref 0–0.1)
BASOPHILS NFR BLD AUTO: 1 % (ref 0–1)
BUN SERPL-MCNC: 10 MG/DL (ref 5–25)
CALCIUM SERPL-MCNC: 8.9 MG/DL (ref 8.4–10.2)
CHLORIDE SERPL-SCNC: 103 MMOL/L (ref 96–108)
CO2 SERPL-SCNC: 23 MMOL/L (ref 21–32)
CREAT SERPL-MCNC: 0.77 MG/DL (ref 0.6–1.3)
EOSINOPHIL # BLD AUTO: 0.2 THOUSAND/ÂΜL (ref 0–0.61)
EOSINOPHIL NFR BLD AUTO: 2 % (ref 0–6)
ERYTHROCYTE [DISTWIDTH] IN BLOOD BY AUTOMATED COUNT: 12.6 % (ref 11.6–15.1)
GFR SERPL CREATININE-BSD FRML MDRD: 97 ML/MIN/1.73SQ M
GLUCOSE SERPL-MCNC: 95 MG/DL (ref 65–140)
HCT VFR BLD AUTO: 39.4 % (ref 36.5–49.3)
HGB BLD-MCNC: 13.5 G/DL (ref 12–17)
IMM GRANULOCYTES # BLD AUTO: 0.03 THOUSAND/UL (ref 0–0.2)
IMM GRANULOCYTES NFR BLD AUTO: 0 % (ref 0–2)
LYMPHOCYTES # BLD AUTO: 3.13 THOUSANDS/ÂΜL (ref 0.6–4.47)
LYMPHOCYTES NFR BLD AUTO: 36 % (ref 14–44)
MAGNESIUM SERPL-MCNC: 2 MG/DL (ref 1.9–2.7)
MCH RBC QN AUTO: 32.8 PG (ref 26.8–34.3)
MCHC RBC AUTO-ENTMCNC: 34.3 G/DL (ref 31.4–37.4)
MCV RBC AUTO: 96 FL (ref 82–98)
MONOCYTES # BLD AUTO: 0.89 THOUSAND/ÂΜL (ref 0.17–1.22)
MONOCYTES NFR BLD AUTO: 10 % (ref 4–12)
NEUTROPHILS # BLD AUTO: 4.51 THOUSANDS/ÂΜL (ref 1.85–7.62)
NEUTS SEG NFR BLD AUTO: 51 % (ref 43–75)
NRBC BLD AUTO-RTO: 0 /100 WBCS
PLATELET # BLD AUTO: 223 THOUSANDS/UL (ref 149–390)
PMV BLD AUTO: 10.3 FL (ref 8.9–12.7)
POTASSIUM SERPL-SCNC: 3.8 MMOL/L (ref 3.5–5.3)
RBC # BLD AUTO: 4.12 MILLION/UL (ref 3.88–5.62)
SODIUM SERPL-SCNC: 134 MMOL/L (ref 135–147)
WBC # BLD AUTO: 8.82 THOUSAND/UL (ref 4.31–10.16)

## 2024-05-25 PROCEDURE — 83735 ASSAY OF MAGNESIUM: CPT

## 2024-05-25 PROCEDURE — 80048 BASIC METABOLIC PNL TOTAL CA: CPT

## 2024-05-25 PROCEDURE — 99239 HOSP IP/OBS DSCHRG MGMT >30: CPT | Performed by: HOSPITALIST

## 2024-05-25 PROCEDURE — 85025 COMPLETE CBC W/AUTO DIFF WBC: CPT

## 2024-05-25 PROCEDURE — 99232 SBSQ HOSP IP/OBS MODERATE 35: CPT | Performed by: INTERNAL MEDICINE

## 2024-05-25 RX ORDER — ASPIRIN 81 MG/1
81 TABLET, CHEWABLE ORAL DAILY
Qty: 30 TABLET | Refills: 2 | Status: SHIPPED | OUTPATIENT
Start: 2024-05-26 | End: 2024-08-24

## 2024-05-25 RX ORDER — ATORVASTATIN CALCIUM 80 MG/1
80 TABLET, FILM COATED ORAL
Qty: 30 TABLET | Refills: 2 | Status: SHIPPED | OUTPATIENT
Start: 2024-05-25 | End: 2024-08-23

## 2024-05-25 RX ORDER — NICOTINE 21 MG/24HR
1 PATCH, TRANSDERMAL 24 HOURS TRANSDERMAL DAILY PRN
Qty: 28 PATCH | Refills: 0 | Status: SHIPPED | OUTPATIENT
Start: 2024-05-25

## 2024-05-25 RX ORDER — AMLODIPINE BESYLATE 2.5 MG/1
2.5 TABLET ORAL DAILY
Qty: 30 TABLET | Refills: 2 | Status: SHIPPED | OUTPATIENT
Start: 2024-05-26 | End: 2024-08-24

## 2024-05-25 RX ORDER — NITROGLYCERIN 0.4 MG/1
0.4 TABLET SUBLINGUAL
Qty: 30 TABLET | Refills: 0 | Status: SHIPPED | OUTPATIENT
Start: 2024-05-25 | End: 2024-08-23

## 2024-05-25 RX ADMIN — ASPIRIN 81 MG CHEWABLE TABLET 81 MG: 81 TABLET CHEWABLE at 09:39

## 2024-05-25 RX ADMIN — PANTOPRAZOLE SODIUM 40 MG: 40 TABLET, DELAYED RELEASE ORAL at 05:25

## 2024-05-25 RX ADMIN — NICOTINE 21 MG: 21 PATCH, EXTENDED RELEASE TRANSDERMAL at 07:43

## 2024-05-25 RX ADMIN — AMLODIPINE BESYLATE 2.5 MG: 2.5 TABLET ORAL at 09:39

## 2024-05-25 NOTE — DISCHARGE SUMMARY
INTERNAL MEDICINE RESIDENCY DISCHARGE SUMMARY     Cecil Hall   63 y.o. male  MRN: [id number]  Room/Bed: /-01     [facility] MED SURG 5   Encounter: [id number]    Principal Problem:    Chest pain  Active Problems:    Tobacco abuse    Gastroesophageal reflux disease without esophagitis      * Chest pain  Assessment & Plan  On initial presentation to [facility], he had intemittant CP that improved with no intervention and at that time EKG was negative for STEMI. However, he then developed crushing CP and EKG did have ST elevations. CP and ST elevations resolved with sublingal nitro. Cardiology recommened nonurgent transfer to [facility] for left heart cath on 5/24. In [place] ED, he was treated with ASA, loaded with ticagrelol, heparin bolused and gtt.     Trops: 6 > 115 > 433 > 343    Plan:  Left heart cath 5/24 showed no evidence of obstructive coronary disease, moderate focal LAD lesion for medical management  Starting amlodipine and nitrate as prophylactic to coronary vasospasm      Gastroesophageal reflux disease without esophagitis  Assessment & Plan  History of GERD, however this may have been atypical/referred angina this whole time. Is on daily prilosec at home.     Plan:  Daily Protonix     Tobacco abuse  Assessment & Plan  Continues to smoke 2 pack per day. Has been urged to quit several times, is motivated to quit but wife still smokes. Discussed that best time to quit is post hospitalization.     Plan:  Nicotine patch  Nicotine lozenges PRN        DETAILS OF HOSPITAL COURSE     This is a 63 y.o. male with a past medical history of GERD and previous NSTEMI, who presents from [facility] on 5/23 for chest pain. On initial presentation to [facility], he had intemittant CP that improved with no intervention and at that time EKG was negative for STEMI. However, he then developed crushing CP and EKG did have ST elevations. CP and ST elevations resolved with  "sublingal nitro. Cardiology recommened nonurgent transfer to [facility] for left heart cath on 5/24. In [facility] ED, he was treated with ASA, loaded with ticagrelol, heparin bolused and gtt.      On arrival to [facility], his main concern is his \"reflux\" and that he has been drinking Mylanta \"like a fish\". He states that he has been having bad heartburn that usually remits with rest and Mylanta, however, this morning at work it did not resolve with rest so he came into the ED. He does not follow with a PCP because he does not like doctors or hospitals and \"hasn't had any health problems\". He endorses current tobacco use with attempts to quit. He socially drinks 1-2 beers per week. He denies any SOB, OVALLES, PND, lower extremity swelling, fevers/chills/sweats.      He will be admitted to [facility] for ACS and left heart cath by cardiology.      He confirms that he is level 1 full code. He would appreciate his wife to be updated by the team.  Patient was admitted to medicine with cardiology consult.  Left heart cath showed no evidence of obstructive coronary disease with focal LAD lesion required medical management.  Possible coronary artery vasospasm started on calcium channel blocker and nitrate.  Patient stable for discharge.  Physical Exam  Constitutional:       General: He is not in acute distress.     Appearance: He is not ill-appearing, toxic-appearing or diaphoretic.   HENT:      Head: Normocephalic and atraumatic.      Nose: Nose normal. No congestion or rhinorrhea.      Mouth/Throat:      Mouth: Mucous membranes are moist.      Pharynx: No oropharyngeal exudate or posterior oropharyngeal erythema.   Eyes:      General: No scleral icterus.        Right eye: No discharge.         Left eye: No discharge.      Pupils: Pupils are equal, round, and reactive to light.   Neck:      Vascular: No carotid bruit.   Cardiovascular:      Rate and Rhythm: Normal rate and regular rhythm.      Pulses: Normal pulses.      Heart sounds: No murmur " heard.     No friction rub. No gallop.   Pulmonary:      Effort: Pulmonary effort is normal. No respiratory distress.      Breath sounds: No stridor. No wheezing, rhonchi or rales.   Chest:      Chest wall: No tenderness.   Abdominal:      General: Abdomen is flat. There is no distension.      Palpations: There is no mass.      Tenderness: There is no abdominal tenderness. There is no right CVA tenderness, left CVA tenderness, guarding or rebound.      Hernia: No hernia is present.   Musculoskeletal:         General: No swelling, tenderness, deformity or signs of injury. Normal range of motion.      Cervical back: Normal range of motion. No rigidity or tenderness.      Right lower leg: No edema.      Left lower leg: No edema.   Lymphadenopathy:      Cervical: No cervical adenopathy.   Skin:     General: Skin is warm.      Coloration: Skin is not jaundiced or pale.      Findings: No bruising, erythema, lesion or rash.   Neurological:      General: No focal deficit present.      Mental Status: He is alert and oriented to person, place, and time.      Cranial Nerves: No cranial nerve deficit.      Sensory: No sensory deficit.      Motor: No weakness.   Psychiatric:         Mood and Affect: Mood normal.         Behavior: Behavior normal.         Thought Content: Thought content normal.         Judgment: Judgment normal.          DISCHARGE INFORMATION     PCP at Discharge:  To be determined    Admitting Provider: Rudi Eason MD  Admission Date: 5/23/2024    Discharge Provider: Rudi Eason MD  Discharge Date: 5/25/2024    Discharge Disposition: [facility]  Discharge Condition: stable  Discharge with Lines: no    Discharge Diet: cardiac diet  Activity Restrictions: none  Test Results Pending at Discharge: None    Discharge Diagnoses:  Principal Problem:    Chest pain  Active Problems:    Tobacco abuse    Gastroesophageal reflux disease without esophagitis  Resolved Problems:    * No resolved hospital  problems. *      Consulting Providers:      Diagnostic & Therapeutic Procedures Performed:  XR chest 1 view portable    Result Date: 5/23/2024  Impression: No acute cardiopulmonary findings. Workstation performed: [id number]     CTA dissection protocol chest/abdomen/pelvis    Result Date: 5/23/2024  Impression: No acute vascular pathology. 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 No acute findings in the chest, abdomen or pelvis. Nonemergent findings above. Workstation performed: [id number]       Code Status: Level 1 - Full Code  Advance Directive & Living Will: <no information>  Power of :    POLST:      Medications:  Current Discharge Medication List        Current Discharge Medication List        Current Discharge Medication List        CONTINUE these medications which have NOT CHANGED    Details   aluminum-magnesium hydroxide-simethicone (MYLANTA) 200-200-20 mg/5 mL suspension Take 30 mL by mouth every 2 (two) hours as needed for indigestion or heartburn  Qty: 355 mL, Refills: 0    Associated Diagnoses: Gastroesophageal reflux disease without esophagitis      aspirin (ECOTRIN LOW STRENGTH) 81 mg EC tablet Take 1 tablet (81 mg total) by mouth daily  Qty:  , Refills: 0    Associated Diagnoses: NSTEMI (non-ST elevated myocardial infarction) (HCC)      atorvastatin (LIPITOR) 40 mg tablet Take 1 tablet (40 mg total) by mouth daily with dinner  Qty: 30 tablet, Refills: 1    Associated Diagnoses: NSTEMI (non-ST elevated myocardial infarction) (HCC)      metoprolol succinate (TOPROL-XL) 25 mg 24 hr tablet Take 1 tablet (25 mg total) by mouth daily  Qty: 30 tablet, Refills: 0    Associated Diagnoses: NSTEMI (non-ST elevated myocardial infarction) (HCC)      naproxen (Naprosyn) 500 mg tablet Take 1 tablet (500 mg total) by mouth 2 (two) times a day with meals  Qty: 30 tablet, Refills: 0    Associated Diagnoses: Fall, initial encounter; Right knee sprain; Abrasions of multiple sites     "  nicotine (NICODERM CQ) 14 mg/24hr TD 24 hr patch Place 1 patch on the skin every 24 hours  Qty: 28 patch, Refills: 0    Associated Diagnoses: Encounter for tobacco use cessation counseling      nicotine polacrilex (COMMIT) 4 MG lozenge Apply 1 lozenge (4 mg total) to the mouth or throat as needed for smoking cessation  Qty: 100 each, Refills: 0    Associated Diagnoses: Encounter for tobacco use cessation counseling      nicotine polacrilex (NICORETTE) 4 mg gum Chew 1 each (4 mg total) as needed for smoking cessation  Qty: 100 each, Refills: 0    Associated Diagnoses: Encounter for tobacco use cessation counseling      omeprazole (PriLOSEC) 20 mg delayed release capsule Take 20 mg by mouth daily             Allergies:  No Known Allergies    FOLLOW-UP     PCP Outpatient Follow-up:  yes  follow-up outpatient with your primary care physician within 1 week    Consulting Providers Follow-up:  none required     Active Issues Requiring Follow-up:   none    Discharge Statement:   I spent 45 minutes minutes discharging the patient. This time was spent on the day of discharge. I had direct contact with the patient on the day of discharge. Additional documentation is required if more than 30 minutes were spent on discharge.    Portions of the record may have been created with voice recognition software.  Occasional wrong word or \"sound a like\" substitutions may have occurred due to the inherent limitations of voice recognition software.  Read the chart carefully and recognize, using context, where substitutions have occurred.    ==  Lb Shaw,   [facility]  Internal Medicine Resident PGY-2   "

## 2024-05-25 NOTE — PROGRESS NOTES
Cardiology Progress Note - Cecil Hall 63 y.o. male MRN: [id number]    Unit/Bed#: -01 Encounter: [id number]      Assessment:  Principal Problem:    Chest pain  Active Problems:    Tobacco abuse    Gastroesophageal reflux disease without esophagitis      Plan:    Type 2 MI - Cecil's cardiac catheterization just showed mild nonobstructive disease with a 45% LAD.  He did have significant ST changes on presentation, which now likely represents coronary vasospasm.  We will continue aspirin, statin and beta-blocker.  We added amlodipine 2.5 mg daily.  Close outpatient follow-up will be arranged at the [facility].  From a cardiac standpoint he can be discharged.    Hyperlipidemia - He will be discharged on high intensity statin, atorvastatin 80 mg daily.    Tobacco abuse - We talked about this today.  He is willing to quit.  He is going to try the nicotine patch.    Subjective:   Patient seen and examined.  No significant events overnight.  Patient overall feels well.  No return of angina.  Cardiac catheterization showing mild nonobstructive disease and a 45% LAD.    Objective:     Vitals: Blood pressure 130/77, pulse 81, temperature 97.9 °F (36.6 °C), temperature source Oral, resp. rate 17, height 6' (1.829 m), weight 87 kg (191 lb 12.8 oz), SpO2 98%., Body mass index is 26.01 kg/m².,   Orthostatic Blood Pressures      Flowsheet Row Most Recent Value   Blood Pressure 130/77 filed at 05/25/2024 0733   Patient Position - Orthostatic VS Sitting filed at 05/25/2024 0733              Intake/Output Summary (Last 24 hours) at 5/25/2024 0908  Last data filed at 5/25/2024 0733  Gross per 24 hour   Intake 120 ml   Output 300 ml   Net -180 ml       No significant arrhythmias seen on telemetry review.       Physical Exam:    GEN: Cecil Hall appears well, alert and oriented x 3, pleasant and cooperative   HEENT: pupils equal, round, and reactive to light; extraocular muscles intact  NECK: supple, no carotid bruits    HEART: regular rhythm, normal S1 and S2, no murmurs, clicks, gallops or rubs   LUNGS: clear to auscultation bilaterally; no wheezes, rales, or rhonchi   ABDOMEN: normal bowel sounds, soft, no tenderness, no distention  EXTREMITIES: peripheral pulses normal; no clubbing, cyanosis, or edema.  Cath site intact, no hematoma and good distal pulses.  NEURO: no focal findings   SKIN: normal without suspicious lesions on exposed skin    Medications:      Current Facility-Administered Medications:     amLODIPine (NORVASC) tablet 2.5 mg, 2.5 mg, Oral, Daily, Ramila Grecsek, CRNP    aspirin chewable tablet 81 mg, 81 mg, Oral, Daily, Ramila Grecsek, CRNP, 81 mg at 05/24/24 0834    atorvastatin (LIPITOR) tablet 80 mg, 80 mg, Oral, HS, Ramila Grecsek, CRNP, 80 mg at 05/24/24 2104    nicotine (NICODERM CQ) 21 mg/24 hr TD 24 hr patch 21 mg, 21 mg, Transdermal, Daily PRN, Ramila Grecsek, CRNP, 21 mg at 05/25/24 0743    nitroglycerin (NITROSTAT) SL tablet 0.4 mg, 0.4 mg, Sublingual, Q5 Min PRN, Ramila Grecsek, CRNP    pantoprazole (PROTONIX) EC tablet 40 mg, 40 mg, Oral, Early Morning, Ramila Grecsek, CRNP, 40 mg at 05/25/24 0525     Labs & Results:        Results from last 7 days   Lab Units 05/25/24  0434 05/24/24  0434 05/23/24  2221   WBC Thousand/uL 8.82 9.64 12.85*   HEMOGLOBIN g/dL 13.5 13.7 14.6   HEMATOCRIT % 39.4 39.5 42.3   PLATELETS Thousands/uL 223 218 232     Results from last 7 days   Lab Units 05/23/24  2221   TRIGLYCERIDES mg/dL 70   HDL mg/dL 35*     Results from last 7 days   Lab Units 05/25/24  0434 05/24/24  0434 05/23/24  2221 05/23/24  1409   POTASSIUM mmol/L 3.8 4.4 4.5 3.4*   CHLORIDE mmol/L 103 102 101 98   CO2 mmol/L 23 23 24 20*   BUN mg/dL 10 9 9 11   CREATININE mg/dL 0.77 0.84 0.89 1.12   CALCIUM mg/dL 8.9 8.9 9.2 9.9   ALK PHOS U/L  --   --   --  59   ALT U/L  --   --   --  12   AST U/L  --   --   --  18     Results from last 7 days   Lab Units 05/24/24  0524 05/23/24  2221 05/23/24  1610   INR    --  0.98 0.92   PTT seconds 57* 43* 25     Results from last 7 days   Lab Units 05/25/24  0434 05/23/24  2221 05/23/24  1409   MAGNESIUM mg/dL 2.0 2.2 1.9         EKG personally reviewed by Lb Mena MD. Initial ECG shows sinus rhythm with significant ST elevations in the inferior leads with subtle ST elevations in the early precordial leads. There was also reciprocal changes seen in the lateral limb leads. Repeat ECG does show resolution of these ST elevations. Comparing this to ECGs back in 2020, there were ST and T wave changes but more diffuse depressions.     ECHO:    Left Ventricle: Left ventricular cavity size is normal. Wall thickness is normal. The left ventricular ejection fraction is 60%. Systolic function is normal. Wall motion is normal. Diastolic function is normal.    Right Ventricle: Right ventricular cavity size is normal. Systolic function is normal.    Counseling / Coordination of Care  Total floor / unit time spent today 25 minutes.  Greater than 50% of total time was spent with the patient and / or family counseling and / or coordination of care.

## 2024-05-28 NOTE — UTILIZATION REVIEW
NOTIFICATION OF ADMISSION DISCHARGE   This is a Notification of Discharge from [facility]. Please be advised that this patient has been discharge from our facility. Below you will find the admission and discharge date and time including the patient’s disposition.   UTILIZATION REVIEW CONTACT:  Sha Childers  Utilization   Network Utilization Review Department  Phone: [phone] x carefully listen to the prompts. All voicemails are confidential.  Email: [email or web address]     ADMISSION INFORMATION  PRESENTATION DATE: 5/23/2024  9:51 PM  OBERVATION ADMISSION DATE:   INPATIENT ADMISSION DATE: 5/23/24  9:51 PM   DISCHARGE DATE: 5/25/2024  1:57 PM   DISPOSITION:Home/Self Care    Network Utilization Review Department  ATTENTION: Please call with any questions or concerns to [phone] and carefully listen to the prompts so that you are directed to the right person. All voicemails are confidential.   For Discharge needs, contact Care Management DC Support Team at [phone] opt. [phone]  Send all requests for admission clinical reviews, approved or denied determinations and any other requests to dedicated fax number below belonging to the campus where the patient is receiving treatment. List of dedicated fax numbers for the Facilities:  FACILITY NAME UR FAX NUMBER   ADMISSION DENIALS (Administrative/Medical Necessity) [phone]   DISCHARGE SUPPORT TEAM ([facility]) [phone]   PARENT CHILD HEALTH (Maternity/NICU/Pediatrics) [phone]   [facility] [phone]   [facility] [phone]   formerly [facility] [phone]   [facility] [phone]   [facility] [phone]   [facility] [phone]   [facility] [phone]   [facility] [phone]   [facility]  [facility] [phone]   [facility] [phone]   [facility] [phone]   [facility] [phone]

## 2024-06-07 ENCOUNTER — TELEPHONE (OUTPATIENT)
Age: 64
End: 2024-06-07

## 2024-06-07 NOTE — TELEPHONE ENCOUNTER
Spoke with Shana from [facility] regarding a form she had faxed to the office, Attending Physician Statement, that needs to be filled out for disability coverage.    Review of chart does not show receipt of the form and unsure where it was faxed.    Spoke with [place] office, only office patient has been seen in, and they have not received anything.    Called Shana back to let her know this information and to have her fax the form to the [place] office for Dr Crouch to fill out as she was the proceduralist.

## 2024-06-12 PROBLEM — I25.10 NONOCCLUSIVE CORONARY ATHEROSCLEROSIS OF NATIVE CORONARY ARTERY: Status: ACTIVE | Noted: 2024-06-12

## 2024-06-12 PROBLEM — I20.1 CORONARY VASOSPASM (HCC): Status: ACTIVE | Noted: 2024-06-12

## 2024-06-12 NOTE — PROGRESS NOTES
Cardiology Follow Up    Cecil Hall  1960  [id number]  [facility] [address] [id number] [phone]    1. Coronary vasospasm (HCC)        2. Nonocclusive coronary atherosclerosis of native coronary artery        3. Tobacco abuse  nicotine (NICODERM CQ) 21 mg/24 hr TD 24 hr patch      4. Dyslipidemia            Discussion/Summary:  Chest pain:   He was recently admitted with epigastric chest discomfort with elevated hs troponin levels and initially had significant EKG changes that resolved with nitroglycerin.  Initially treated as a NSTEMI, Underwent non-urgent LHC which showed 45% LAD disease only.  Most likely etiology was felt to be coronary vasospasm and he was placed on amlodipine.  Echo showed preserved LVEF.  Similar presentation in 2020 where normal coronary arteries were noted on LHC.  He has not had any chest pain since discharge.   He will continue aspirin and statin given non-obstructive CAD.  I did encourage cardiac rehab.  Patient was given return to work note for next week.    Dyslipidemia  TC-169, triglycerides-70, HDL-35, LDL-120  Will aim for LDL <70 given non-obstructive CAD  Now on atorvastatin 80 mg daily  Will check lipid panel + CMP in 3 months. PCP has ordered these labs per patient    Tobacco use  He has not had a cigarette since discharge. Congratulated him on this. Prescribed nicotine patches     He will RTO in 6 months with Dr. Escalante or sooner if necessary. He will call the office with any concerns in the interim.     Interval History:   Cecil Hall is a 63 y.o. male with coronary vasospasm, dyslipidemia, tobacco use who presents to the office today for hospital follow up.    He recently presented to this emergency department with epigastric chest discomfort.  He underwent a CTA dissection protocol which was negative for dissection.  While in the ER he  developed recurrent chest discomfort.  An EKG was performed which showed possible STEMI.  After this he was given sublingual nitroglycerin and a repeat EKG was performed.  This showed resolution of ST changes as well as resolution of his chest pain.  He was still treated as a non-STEMI initially and was placed on IV heparin and was loaded with both aspirin and ticagrelor.  He was transferred to [facility] where he did undergo he will repeat left heart catheterization showing a 45% LAD lesion only.  It was felt that the most likely etiology for his presentation was coronary vasospasm.  He was placed on amlodipine 2.5 mg daily.    In 2020 the patient had a similar presentation.  He presented to the ER with epigastric burning.  He had elevated troponin levels and subsequently underwent left heart catheterization.  At that time patent coronary arteries were noted.    He overall reports feeling well since discharge.  He has not had any recurrent chest pain/pressure/discomfort.  He has not taken any sublingual nitroglycerin.  He denies any dyspnea on exertion.  Denies any lower extremity edema.  Denies any complaints at right radial site.  Denies any lightheadedness, dizziness, or syncope.  Denies palpitations.    He has not had a cigarette since discharge. He has been using nicotine patches.    Medical Problems       Problem List       NSTEMI (non-ST elevated myocardial infarction) (HCC)    Tobacco abuse (Chronic)    Gastroesophageal reflux disease without esophagitis (Chronic)        Past Medical History:   Diagnosis Date    GERD (gastroesophageal reflux disease)     Pneumohemothorax      Social History     Socioeconomic History    Marital status: /Civil Union     Spouse name: Not on file    Number of children: Not on file    Years of education: Not on file    Highest education level: Not on file   Occupational History    Not on file   Tobacco Use    Smoking status: Former     Current packs/day: 0.00      Types: Cigarettes     Quit date:      Years since quittin.4    Smokeless tobacco: Never   Vaping Use    Vaping status: Never Used   Substance and Sexual Activity    Alcohol use: Not on file    Drug use: Not on file    Sexual activity: Not on file   Other Topics Concern    Not on file   Social History Narrative    Not on file     Social Determinants of Health     Financial Resource Strain: Not on file   Food Insecurity: Not on file   Transportation Needs: Not on file   Physical Activity: Not on file   Stress: Not on file   Social Connections: Not on file   Intimate Partner Violence: Not on file   Housing Stability: Not on file      History reviewed. No pertinent family history.  Past Surgical History:   Procedure Laterality Date    CARDIAC CATHETERIZATION Left 2024    Procedure: Cardiac Left Heart Cath;  Surgeon: Sylwia Crouch DO;  Location: [facility];  Service: Cardiology    CARDIAC CATHETERIZATION N/A 2024    Procedure: Cardiac Coronary Angiogram;  Surgeon: Sylwia Crouch DO;  Location: [facility];  Service: Cardiology    HERNIA REPAIR         Current Outpatient Medications:     amLODIPine (NORVASC) 2.5 mg tablet, Take 1 tablet (2.5 mg total) by mouth daily, Disp: 30 tablet, Rfl: 2    aspirin 81 mg chewable tablet, Chew 1 tablet (81 mg total) daily, Disp: 30 tablet, Rfl: 2    atorvastatin (LIPITOR) 80 mg tablet, Take 1 tablet (80 mg total) by mouth daily at bedtime, Disp: 30 tablet, Rfl: 2    Coenzyme Q10 (Co Q-10) 200 MG CAPS, Take 200 mg by mouth in the morning, Disp: , Rfl:     nicotine (NICODERM CQ) 21 mg/24 hr TD 24 hr patch, Place 1 patch on the skin over 24 hours daily as needed (tobacco hx), Disp: 28 patch, Rfl: 0    nitroglycerin (NITROSTAT) 0.4 mg SL tablet, Place 1 tablet (0.4 mg total) under the tongue every 5 (five) minutes as needed for chest pain, Disp: 30 tablet, Rfl: 0    omeprazole (PriLOSEC) 20 mg delayed release capsule, Take 20 mg by mouth daily,  "Disp: , Rfl:   No Known Allergies    Labs:     Chemistry        Component Value Date/Time     12/11/2014 0515    K 3.8 05/25/2024 0434    K 3.6 12/11/2014 0515     05/25/2024 0434     12/11/2014 0515    CO2 23 05/25/2024 0434    CO2 26.8 12/11/2014 0515    BUN 10 05/25/2024 0434    BUN 7 12/11/2014 0515    CREATININE 0.77 05/25/2024 0434    CREATININE 0.76 12/11/2014 0515        Component Value Date/Time    CALCIUM 8.9 05/25/2024 0434    CALCIUM 8.6 12/11/2014 0515    ALKPHOS 59 05/23/2024 1409    ALKPHOS 64 12/10/2014 0620    AST 18 05/23/2024 1409    AST 20 12/10/2014 0620    ALT 12 05/23/2024 1409    ALT 53 12/10/2014 0620    BILITOT 0.6 12/10/2014 0620            Lab Results   Component Value Date    CHOL 149 12/11/2014     Lab Results   Component Value Date    HDL 35 (L) 05/23/2024    HDL 45 11/25/2020    HDL 38 12/11/2014     Lab Results   Component Value Date    LDLCALC 120 (H) 05/23/2024    LDLCALC 137 (H) 11/25/2020    LDLCALC 81 12/11/2014     Lab Results   Component Value Date    TRIG 70 05/23/2024    TRIG 75 11/25/2020    TRIG 152 12/11/2014     No results found for: \"CHOLHDL\"    Imaging: Echo complete w/ contrast if indicated    Result Date: 5/24/2024  Narrative:   Left Ventricle: Left ventricular cavity size is normal. Wall thickness is normal. The left ventricular ejection fraction is 60%. Systolic function is normal. Wall motion is normal. Diastolic function is normal.   Right Ventricle: Right ventricular cavity size is normal. Systolic function is normal.     Cardiac catheterization    Result Date: 5/24/2024  Narrative:   No angiographic evidence of signficant obstructive CAD, but there is a moderate focal LAD lesion warranting med mgmt   LVEDP is normal without gradient on LV-AO pullback   Prox LAD to Mid LAD lesion is 45% stenosed.     XR chest 1 view portable    Result Date: 5/23/2024  Narrative: CHEST INDICATION:   chest pain. COMPARISON: 12/10/2014 EXAM PERFORMED/VIEWS:  XR " CHEST PORTABLE FINDINGS: Lung volumes are within normal limits. Lungs are clear. No effusion or pneumothorax. Heart, mediastinal and hilar structures are within normal limits. No acute osseous or soft tissue pathology.     Impression: No acute cardiopulmonary findings. Workstation performed: [id number]     CTA dissection protocol chest/abdomen/pelvis    Result Date: 5/23/2024  Narrative: CT ANGIOGRAM OF THE CHEST,  ABDOMEN AND PELVIS WITH AND WITHOUT IV CONTRAST INDICATION:   chest pain, abd pain, eval for dissection. Intermittent epigastric region pain for a few weeks. Gastroesophageal reflux and burping. History of myocardial infarction, smoking and hernia repair. COMPARISON: 11/24/2020 TECHNIQUE:  CT angiogram examination of the chest, abdomen and pelvis was performed according to standard protocol.  This examination, like all CT scans performed in the [facility], was performed utilizing techniques to minimize radiation dose exposure, including the use of iterative reconstruction and automated exposure control.   Contrast as well as noncontrast images were obtained.  3D reconstructions were performed an independent workstation, and are supplied for review.  Rad dose 1373.3 mGy-cm IV Contrast:  100 mL of iohexol (OMNIPAQUE) Enteric Contrast:  Not given. FINDINGS: VASCULAR STRUCTURES: Normal aortic caliber and enhancement. No intramural hematoma. Mild atherosclerosis. Chest: Patent aortic arch branch vessels and visualized vertebral arteries. Abdomen: Patent celiac artery and branches, superior and inferior mesenteric and renal arteries. Accessory right lower pole renal artery. Pelvis: Bilateral common, internal and external iliac arteries and visualized femoral arteries are patent. Bilateral, chronic common iliac atherosclerosis with ulcerative plaque. No penetrating ulcers or dissection. OTHER FINDINGS CHEST LUNGS: Dependent atelectasis. Patent central airways. Chronic emphysema and left  perifissural benign intrapulmonary lymph node. Calcified right middle lobe granuloma. PLEURA:  Within normal limits. HEART/PULMONARY ARTERIAL TREE: Normal heart size.  Patent central pulmonary arteries. MEDIASTINUM AND MISHA: Similar mild gynecomastia. CHEST WALL AND LOWER NECK:   Within normal limits. ABDOMEN LIVER/BILIARY TREE: Small, benign likely granulomatous solitary calcification. Otherwise within normal limits. GALLBLADDER: SPLEEN:  Within normal limits. PANCREAS:  Within normal limits. ADRENAL GLANDS:  Within normal limits. KIDNEYS/URETERS:  Within normal limits. STOMACH AND VISUALIZED BOWEL:  Within normal limits. APPENDIX: Within normal limits. ABDOMINAL  CAVITY:  No abnormal air, fluid or enlarged lymph nodes. PELVIS: REPRODUCTIVE: Within normal limits for age. BLADDER:  Within normal limits. ABDOMINAL/PELVIC WALL/INGUINAL REGIONS: Within normal limits. BONES:  Degenerative changes.     Impression: No acute vascular pathology. 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 No acute findings in the chest, abdomen or pelvis. Nonemergent findings above. Workstation performed: [id number]       Review of Systems   All other systems reviewed and are negative.      Vitals:    06/13/24 1235   BP: 118/72   Pulse: 82   SpO2: 99%     Vitals:    06/13/24 1235   Weight: 89.4 kg (197 lb 3.2 oz)     Height: 6' (182.9 cm)   Body mass index is 26.75 kg/m².    Physical Exam:  Physical Exam  Vitals reviewed.   Constitutional:       General: He is not in acute distress.     Appearance: He is well-developed. He is not diaphoretic.   HENT:      Head: Normocephalic and atraumatic.   Eyes:      Pupils: Pupils are equal, round, and reactive to light.   Neck:      Vascular: No carotid bruit.   Cardiovascular:      Rate and Rhythm: Normal rate and regular rhythm.      Pulses:           Radial pulses are 2+ on the right side and 2+ on the left side.      Heart sounds: S1 normal and S2 normal. No murmur  heard.  Pulmonary:      Effort: Pulmonary effort is normal. No respiratory distress.      Breath sounds: Normal breath sounds. No wheezing or rales.   Abdominal:      General: There is no distension.      Palpations: Abdomen is soft.      Tenderness: There is no abdominal tenderness.   Musculoskeletal:         General: Normal range of motion.      Cervical back: Normal range of motion.      Right lower leg: No edema.      Left lower leg: No edema.   Skin:     General: Skin is warm and dry.      Capillary Refill: Capillary refill takes less than 2 seconds.      Findings: No erythema.          Neurological:      General: No focal deficit present.      Mental Status: He is alert and oriented to person, place, and time.      Gait: Gait normal.   Psychiatric:         Mood and Affect: Mood normal.         Behavior: Behavior normal.

## 2024-06-13 ENCOUNTER — TELEPHONE (OUTPATIENT)
Age: 64
End: 2024-06-13

## 2024-06-13 ENCOUNTER — OFFICE VISIT (OUTPATIENT)
Dept: CARDIOLOGY CLINIC | Facility: HOSPITAL | Age: 64
End: 2024-06-13
Payer: COMMERCIAL

## 2024-06-13 VITALS
BODY MASS INDEX: 26.71 KG/M2 | SYSTOLIC BLOOD PRESSURE: 118 MMHG | HEART RATE: 82 BPM | OXYGEN SATURATION: 99 % | WEIGHT: 197.2 LBS | HEIGHT: 72 IN | DIASTOLIC BLOOD PRESSURE: 72 MMHG

## 2024-06-13 DIAGNOSIS — I25.10 NONOCCLUSIVE CORONARY ATHEROSCLEROSIS OF NATIVE CORONARY ARTERY: ICD-10-CM

## 2024-06-13 DIAGNOSIS — Z72.0 TOBACCO ABUSE: Chronic | ICD-10-CM

## 2024-06-13 DIAGNOSIS — E78.5 DYSLIPIDEMIA: ICD-10-CM

## 2024-06-13 DIAGNOSIS — I20.1 CORONARY VASOSPASM (HCC): Primary | ICD-10-CM

## 2024-06-13 PROCEDURE — 99214 OFFICE O/P EST MOD 30 MIN: CPT | Performed by: PHYSICIAN ASSISTANT

## 2024-06-13 RX ORDER — NICOTINE 21 MG/24HR
1 PATCH, TRANSDERMAL 24 HOURS TRANSDERMAL DAILY PRN
Qty: 28 PATCH | Refills: 0 | Status: SHIPPED | OUTPATIENT
Start: 2024-06-13

## 2024-06-13 RX ORDER — UBIDECARENONE 75 MG
200 CAPSULE ORAL DAILY
COMMUNITY

## 2024-06-13 NOTE — TELEPHONE ENCOUNTER
Shana from [facility] is faxing paperwork  to the [place] office to be filled out by Tia for patient's disability.  She would like confirmation that the paperwork has been received.    # [phone]

## 2024-06-13 NOTE — LETTER
June 13, 2024     Patient: Cecil Hall  YOB: 1960  Date of Visit: 6/13/2024      To Whom it May Concern:    Cecil Hall is under my professional care. Cecil was seen in my office on 6/13/2024. Cecil may return to work on Monday, 6/17/24 without restrictions.    If you have any questions or concerns, please don't hesitate to call.         Sincerely,          Ivory King PA-C

## 2024-06-14 NOTE — TELEPHONE ENCOUNTER
No forms were faxed over calling Shana to re-fax. Spoke with a Hanny she will resend the forms, gave her our number and fax

## 2024-07-24 ENCOUNTER — CLINICAL SUPPORT (OUTPATIENT)
Dept: CARDIAC REHAB | Facility: HOSPITAL | Age: 64
End: 2024-07-24
Payer: COMMERCIAL

## 2024-07-24 DIAGNOSIS — R07.9 CHEST PAIN: ICD-10-CM

## 2024-07-24 DIAGNOSIS — I21.4 NSTEMI (NON-ST ELEVATED MYOCARDIAL INFARCTION) (HCC): Primary | ICD-10-CM

## 2024-07-24 PROCEDURE — 93797 PHYS/QHP OP CAR RHAB WO ECG: CPT

## 2024-07-24 NOTE — PROGRESS NOTES
CARDIAC REHABILITATION   ASSESSMENT AND INDIVIDUALIZED TREATMENT PLAN  INITIAL           Today's date: 2024   # of Exercise Sessions Completed: 1  Patient name: Cecil Hall      : 1960  Age: 64 y.o.       MRN: [id number]  Referring Physician: Lb Shaw DO  Cardiologist: Nela Vigil MD  Provider: Diane  Clinician: Fany Foreman RN        Treatment is tailored to this patient's individual needs.  The ITP was reviewed with the patient and all questions were answered to their satisfaction.  Additional ITP documentation can be found electronically including daily and monthly exercise summaries, daily session notes with ECG summaries, education notes, daily medication reconciliation, and daily physician supervision.      Comments: Yusuf has been back to work full time since . He states he is not having any issues at work. He has remained smoke free and wearing a nicotine patch. He states at times he is tempted to smoke. He did 8 min on his submax ETT. He denied ant symptoms till the test stopped at 8 min with a RPE of 6 and 30 beats above resting HR . Then he said he had slight epigastric discomfort for the past 2 minutes. This discomfort did stop after resting 1 minute. He stated this is how his pain started before but it progressed. His cardiac rhythm was NSR -sinus tachy  no ectopy.  He is planning on starting cardiac rehab in the #pm class after work on . He was instructed on the use of nitro and to carry his nitro with him.         Dx:   Encounter Diagnosis   Name Primary?    Chest pain        Description of Diagnosis: NSTEMI  Date of onset: 24  Other Cardiac History: In  he had a similar presentation. He had a cardiac cath and had no blockages. He stated he stopped taking his medication after that incident because he didn`t have a MI         ASSESSMENT    Medical History:   Past Medical History:   Diagnosis Date    GERD (gastroesophageal reflux disease)      Pneumohemothorax        Family History:  No family history on file.    Allergies:   Patient has no known allergies.    Current Medications:   Current Outpatient Medications   Medication Sig Dispense Refill    amLODIPine (NORVASC) 2.5 mg tablet Take 1 tablet (2.5 mg total) by mouth daily 30 tablet 2    aspirin 81 mg chewable tablet Chew 1 tablet (81 mg total) daily 30 tablet 2    atorvastatin (LIPITOR) 80 mg tablet Take 1 tablet (80 mg total) by mouth daily at bedtime 30 tablet 2    Coenzyme Q10 (Co Q-10) 200 MG CAPS Take 200 mg by mouth in the morning      nicotine (NICODERM CQ) 21 mg/24 hr TD 24 hr patch Place 1 patch on the skin over 24 hours daily as needed (tobacco hx) 28 patch 0    nitroglycerin (NITROSTAT) 0.4 mg SL tablet Place 1 tablet (0.4 mg total) under the tongue every 5 (five) minutes as needed for chest pain 30 tablet 0    omeprazole (PriLOSEC) 20 mg delayed release capsule Take 20 mg by mouth daily       No current facility-administered medications for this visit.       Medication compliance: Yes   Comments: Pt reports to be compliant with medications    Physical Limitations: none    Fall Risk: Low   Comments: Ambulates with a steady gait with no assist device    Cultural needs: none      CAD Risk Factors:  Cholesterol: Yes  HTN: Yes  DM: No  Obesity: No   Inactivity: Yes      EXERCISE ASSESSMENT:     Initial Fitness Assessment: Submaximal TM ETT:  Resting:  BP: 120/82  HR: 77  SpO2: 98  Dyspnea: 0, Exercise:  BP: 160/80  HR: 115  SpO2: 98%  Dyspnea: 2, METs:  4.29, ECG Summary: 8, Symptoms: slight dyspnea and slight epigastric burning , Test terminated at:  RPE 6 and RHR +30, and Comments: stated after the test that he had epigastric discomfort the last 2 min . This discomfort relieved in about 1 min in recovery       ECG INTERPRETATION:  NSR -sinus tachycardia with exercise - no ecotopy     Current Functional Status:  Occupation: full time job   Recreation/Physical Activity:  fish/hunt  ADL’s:No limitations able to perform self-care  Kevil: No limitations  Home exercise: none  Other Comments:       SMART Exercise Goals:   10% improvement in functional capacity based on max METs achieved in initial fitness assessment  reduced dyspnea with physical activity    increased exercise capacity by 40% based on peak METs tolerated in cardiac rehab exercise session  maintain > 150 minutes per week of moderate intensity exercise    Patient Specific EXERCISE GOALS:       Start a regular exercise program   Not get epigastric discomfort with exercise  Increase strength and stamina    Functional Capacity Screening Tool:  [facility] Activity Status Index:  8.97 METs      PSYCHOSOCIAL ASSESSMENT:    Date of last Assessment:  7/24/24  Depression screening:  PHQ-9 = 1    Interpretation:  1-4 = Minimal Depression  Anxiety screening:  JEREMY-7 = 1    Interpretation: 0-4  = Not anxious    Pt self-report of depression and anxiety   Patient reports feelings of anxiety had accident at work  last year  almost got hurt    Self-reported stress level:  5   Stressors:  work  Stress Management Tools: practice Relaxation Techniques, exercise, and gardening    SMART Psychosocial Goals:     Pain in [facility] Score < 3 and  Increased interest and pleasure in doing things has PTSD from a accident at work -     Patient Specific PSYCHOCOSOCIAL GOALS:    Reduce PTSD   Develop a regular exercise routine  Go fishing in his boat    Quality of Life Screen:  (Higher score indicates disease impact on QOL)  [facility] COOP score: 16/45     Social Support:     spouse, children, and friends  Community/Social Activities: friends at work     Psychosocial Assessment as it relates to rehabilitation:   Patient denies issues with his/her family or home life that may affect their rehabilitation efforts.       NUTRITION ASSESSMENT:    Initial Weight:  208  Current Weight: 208    Height:   Ht Readings from Last 1 Encounters:   06/13/24 6' (1.829  m)       Rate Your Plate Score: 45/81    Diabetes: N/A  A1c: 5.5    last measured: 5/23/24    Lipid management: Last lipid profile 5/23/24  Chol 169  TRG 70  HDL 35      Current Dietary Habits:  His wife cooks . He takes left overs for lunch/ Drinks soda    SMART Nutrition Goals:   LDL <100, HDL >40, Improved Rate Your Plate score  >58, eat whole grain breads, brown rice and whole grain cereals, eat 5 or more servings of fruits and vegetables a day, eat 2 or more servings of low fat milk or yogurt a day, eat no more than 6oz of meat per day, eat red meat once a week or less, choose lean beef or rarely eat beef, and rarely eat processed meats or eat low fat processed meats    Patient Specific NUTRITION GOALS:     1. Drink more water   2. Drink less soda   3. Eat more fruits and vegatables    Drug/Alcohol Use:   No      OTHER CORE COMPONENT ASSESSMENT:    Tobacco Use:     Brief counseling by cardiac rehab professional  Date: 7/24/24  Pt is using nicotine replacement  Pt quit 5/23/24   and has abstained    Anginal Symptoms:  chest pressure   NTG use: Compliant with carrying NTG, Understands proper use, Reviewed Proper use, Pt has not used NTG since event, and has it in his lunch can at work    SMART Goals:   medication compliance and abstain from smoking    Patient Specific CORE COMPONENT GOALS:    Remain smokefree  Develop exercise program at home   Continue working with less stress      INDIVIDUALIZED TREATMENT PLAN      EXERCISE GOALS and PLAN      Progress toward Exercise goals:   Reviewed Pt goals and determined plan of care, Will continue to educate and progress as tolerated.    Exercise Intervention:    education on home exercise guidelines and home exercise 30+ mins 2 days opposite CR    The patient was counseled on exercise guidelines to achieve a minimum of 150 mins/wk of moderate intensity (RPE 4-6)   exercise and encouraged to add 1-2 days of exercise on opposite days of cardiac rehab as tolerated.      Current Aerobic Exercise Prescription:      Frequency: 3 days/week   Supplement with home exercise 2+ days/wk as tolerated       Minutes: 8 submax ETT         METS: 4.29            HR:  20-30bpm above resting   RPE: 4-6         Modalities: Treadmill     Exercise workloads will be progressed gradually as tolerated, within limits of patient's ability, and according to the patient's   response to the exercise program.      Aerobic Exercise Prescription Plan for Progression   Frequency: 3 days/week of cardiac rehab       Supplement with home exercise 2+ days/wk as tolerated    Minutes: 40       >150 mins/wk of moderate intensity exercise   METS: 3-5   HR: Intensity based on RPE 4-6      RPE: 4-6   Modalities: Treadmill, UBE, Rower, NuStep, and Recumbent bike    Strength trainin-3 days / week  12-15 repetitions  1-2 sets per modality   Will be added following 2-3 weeks of monitored exercise sessions   Modalities: Chest Press, Pull Downs, Arm Extension, and Arm Curl    Home Exercise: none    Exercise Education: benefit of exercise for CAD risk factors, home exercise guidelines, AHA guidelines to achieve >150 mins/wk of moderate exercise, RPE scale, Group class: Risk Factors for Heart Disease, and physical activity/exercise in extreme weather conditions     Readiness to change: Contemplation:  (Acknowledging that there is a problem but not yet ready or sure of wanting to make a change)      NUTRITION GOALS AND PLAN      Nutritional   Reviewed patient's Rate your Plate. Discussed key elements of heart healthy eating. Reviewed patient goals for dietary modifications and their clinical implications.  Reviewed most recent lipid profile.     Patient's progress toward Nutrition goals:    Reviewed Pt goals and determined plan of care, Will continue to educate and progress as tolerated.      Nutrition Intervention:   group Class: Heart Healthy Eating, increase intake of whole grains, replace refined flours with whole  grains, increase daily intake of fruits and vegetables, increase daily intake of low fat dairy, eat red meat once a week or less, reduce intake and /or  rarely eat processed meats , eat more meatless meals, cook without fats or oils, and never/rarely eat fried foods    Measurable goals were based Rate Your Plate Dietary Self-Assessment. These are the areas in which the patient could score higher on the assessment.  Goals include recommendations for a heart healthy diet based on American Heart Association.    Nutrition Education:   heart healthy eating principles  weight loss and management strategies  nutrition for  lipid management  group class: Heart Healthy Eating  group class:  Label Reading    Readiness to change: Contemplation:  (Acknowledging that there is a problem but not yet ready or sure of wanting to make a change)      PSYCHOSOCIAL GOALS AND PLAN    Psychosocial Assessment as it relates to rehabilitation:   Patient denies issues with his/her family or home life that may affect their rehabilitation efforts.     Patient's progress toward Psychosocial goals:    Reviewed Pt goals and determined plan of care, Will continue to educate and progress as tolerated.    Psychosocial Intervention:   Class: Stress and Your Health, Practice relaxation techniques, Exercise, Spend time outdoors, and Enjoy a hobby such as fishing    Psychosocial Education: signs/sxs of depression, benefits of a positive support system, and depression and CAD    Information to utilize [employer] was provided as well as contact information for counseling through  Behavioral Health and group psychotherapy groups available.    Readiness to change: Contemplation:  (Acknowledging that there is a problem but not yet ready or sure of wanting to make a change)      OTHER CORE COMPONENTS GOALS and PLAN      Blood Pressure will be monitored throughout the program and cardiologist will be notified of elevated trends.    Pt will be encouraged to  monitor home BP if advised by cardiologist.    Tobacco Intervention:   Pt quit 5/24 and has abstained since quitting.      Progress toward Core Component goals:   Reviewed Pt goals and determined plan of care, Will continue to educate and progress as tolerated.    Other Core Components Intervention:   group class: Common Heart Medications, medication compliance, call free Quitline - -800-QUIT-NOW ([phone]), complete abstention from smoking, avoid processed foods, and engage in regular exercise    Group and Individual Education:  proper use of sublingual NTG, effects of nicotine and tobacco, identifying smoking triggers, and relapse education    Readiness to change: Contemplation:  (Acknowledging that there is a problem but not yet ready or sure of wanting to make a change)

## 2024-07-29 ENCOUNTER — CLINICAL SUPPORT (OUTPATIENT)
Dept: CARDIAC REHAB | Facility: HOSPITAL | Age: 64
End: 2024-07-29
Payer: COMMERCIAL

## 2024-07-29 DIAGNOSIS — I21.4 NSTEMI (NON-ST ELEVATED MYOCARDIAL INFARCTION) (HCC): Primary | ICD-10-CM

## 2024-07-29 PROCEDURE — 93798 PHYS/QHP OP CAR RHAB W/ECG: CPT

## 2024-07-30 ENCOUNTER — APPOINTMENT (OUTPATIENT)
Dept: CARDIAC REHAB | Facility: HOSPITAL | Age: 64
End: 2024-07-30
Payer: COMMERCIAL

## 2024-07-31 NOTE — PROGRESS NOTES
[facility]  [facility]    Cardiac  Rehabilitation      Dear Dr. Musa MD,     Thank you for referring your patient to our Cardiac  Rehabilitation program. The patient has completed his initial evaluation and one exercise session.  However, rehab is being discontinued at this time due to:    Patient arrived on 7/29/2024 for his first exercise session. 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 Patient was issued appropriate education materials. Patient encouraged to reach out with any future questions or concerns.       Please contact us at [phone] if you have any questions about this patent’s case.  Thank you for your continued support of cardiac rehabilitation.       Sincerely,      Beatriz Milner, MPT, CCRP

## 2024-08-17 ENCOUNTER — APPOINTMENT (OUTPATIENT)
Dept: LAB | Facility: MEDICAL CENTER | Age: 64
End: 2024-08-17
Payer: COMMERCIAL

## 2024-11-20 ENCOUNTER — HOSPITAL ENCOUNTER (OUTPATIENT)
Dept: ULTRASOUND IMAGING | Facility: HOSPITAL | Age: 64
Discharge: HOME | End: 2024-11-20
Attending: FAMILY MEDICINE
Payer: COMMERCIAL

## 2024-11-20 DIAGNOSIS — R10.11 RIGHT UPPER QUADRANT PAIN: ICD-10-CM

## 2024-11-20 PROCEDURE — 76705 ECHO EXAM OF ABDOMEN: CPT

## 2024-12-06 ENCOUNTER — HOSPITAL ENCOUNTER (OUTPATIENT)
Dept: NUCLEAR MEDICINE | Facility: HOSPITAL | Age: 64
Discharge: HOME | End: 2024-12-06
Attending: FAMILY MEDICINE
Payer: COMMERCIAL

## 2024-12-06 DIAGNOSIS — R10.11 RIGHT UPPER QUADRANT PAIN: ICD-10-CM

## 2024-12-06 PROCEDURE — A9537 TC99M MEBROFENIN: HCPCS

## 2024-12-06 PROCEDURE — 78227 HEPATOBIL SYST IMAGE W/DRUG: CPT

## 2024-12-06 RX ORDER — SINCALIDE 5 UG/5ML
0.02 INJECTION, POWDER, LYOPHILIZED, FOR SOLUTION INTRAVENOUS
Status: COMPLETED | OUTPATIENT
Start: 2024-12-06 | End: 2024-12-06

## 2024-12-06 RX ADMIN — SINCALIDE 1.8 MCG: 5 INJECTION, POWDER, LYOPHILIZED, FOR SOLUTION INTRAVENOUS at 13:23

## 2025-03-06 ENCOUNTER — APPOINTMENT (OUTPATIENT)
Dept: LAB | Facility: MEDICAL CENTER | Age: 65
End: 2025-03-06
Payer: COMMERCIAL

## 2025-03-06 DIAGNOSIS — I21.4 ACUTE MYOCARDIAL INFARCTION, SUBENDOCARDIAL INFARCTION, INITIAL EPISODE OF CARE (HCC): ICD-10-CM

## 2025-03-06 LAB
ALBUMIN SERPL BCG-MCNC: 4.2 G/DL (ref 3.5–5)
ALP SERPL-CCNC: 77 U/L (ref 34–104)
ALT SERPL W P-5'-P-CCNC: 25 U/L (ref 7–52)
ANION GAP SERPL CALCULATED.3IONS-SCNC: 10 MMOL/L (ref 4–13)
AST SERPL W P-5'-P-CCNC: 23 U/L (ref 13–39)
BILIRUB SERPL-MCNC: 0.63 MG/DL (ref 0.2–1)
BUN SERPL-MCNC: 9 MG/DL (ref 5–25)
CALCIUM SERPL-MCNC: 9.4 MG/DL (ref 8.4–10.2)
CHLORIDE SERPL-SCNC: 103 MMOL/L (ref 96–108)
CHOLEST SERPL-MCNC: 101 MG/DL (ref ?–200)
CO2 SERPL-SCNC: 27 MMOL/L (ref 21–32)
CREAT SERPL-MCNC: 1.01 MG/DL (ref 0.6–1.3)
ERYTHROCYTE [DISTWIDTH] IN BLOOD BY AUTOMATED COUNT: 12.4 % (ref 11.6–15.1)
GFR SERPL CREATININE-BSD FRML MDRD: 78 ML/MIN/1.73SQ M
GLUCOSE P FAST SERPL-MCNC: 104 MG/DL (ref 65–99)
HCT VFR BLD AUTO: 40.9 % (ref 36.5–49.3)
HDLC SERPL-MCNC: 35 MG/DL
HGB BLD-MCNC: 14 G/DL (ref 12–17)
LDLC SERPL CALC-MCNC: 47 MG/DL (ref 0–100)
MCH RBC QN AUTO: 32.6 PG (ref 26.8–34.3)
MCHC RBC AUTO-ENTMCNC: 34.2 G/DL (ref 31.4–37.4)
MCV RBC AUTO: 95 FL (ref 82–98)
NONHDLC SERPL-MCNC: 66 MG/DL
PLATELET # BLD AUTO: 252 THOUSANDS/UL (ref 149–390)
PMV BLD AUTO: 11.6 FL (ref 8.9–12.7)
POTASSIUM SERPL-SCNC: 4.5 MMOL/L (ref 3.5–5.3)
PROT SERPL-MCNC: 7.7 G/DL (ref 6.4–8.4)
RBC # BLD AUTO: 4.3 MILLION/UL (ref 3.88–5.62)
SODIUM SERPL-SCNC: 140 MMOL/L (ref 135–147)
TRIGL SERPL-MCNC: 93 MG/DL (ref ?–150)
WBC # BLD AUTO: 9.29 THOUSAND/UL (ref 4.31–10.16)

## 2025-03-06 PROCEDURE — 36415 COLL VENOUS BLD VENIPUNCTURE: CPT

## 2025-03-06 PROCEDURE — 80053 COMPREHEN METABOLIC PANEL: CPT

## 2025-03-06 PROCEDURE — 85027 COMPLETE CBC AUTOMATED: CPT

## 2025-03-06 PROCEDURE — 80061 LIPID PANEL: CPT

## 2025-05-29 ENCOUNTER — OFFICE VISIT (OUTPATIENT)
Dept: CARDIOLOGY CLINIC | Facility: HOSPITAL | Age: 65
End: 2025-05-29
Payer: COMMERCIAL

## 2025-05-29 VITALS
OXYGEN SATURATION: 99 % | SYSTOLIC BLOOD PRESSURE: 136 MMHG | HEART RATE: 96 BPM | WEIGHT: 230 LBS | HEIGHT: 72 IN | DIASTOLIC BLOOD PRESSURE: 88 MMHG | BODY MASS INDEX: 31.15 KG/M2

## 2025-05-29 DIAGNOSIS — I25.10 NONOCCLUSIVE CORONARY ATHEROSCLEROSIS OF NATIVE CORONARY ARTERY: Primary | ICD-10-CM

## 2025-05-29 DIAGNOSIS — I21.4 NSTEMI (NON-ST ELEVATED MYOCARDIAL INFARCTION) (HCC): ICD-10-CM

## 2025-05-29 DIAGNOSIS — I10 PRIMARY HYPERTENSION: ICD-10-CM

## 2025-05-29 DIAGNOSIS — E78.00 HYPERCHOLESTEROLEMIA: ICD-10-CM

## 2025-05-29 PROCEDURE — 99214 OFFICE O/P EST MOD 30 MIN: CPT | Performed by: INTERNAL MEDICINE

## 2025-06-02 PROBLEM — I10 PRIMARY HYPERTENSION: Status: ACTIVE | Noted: 2025-06-02

## 2025-06-02 PROBLEM — E78.00 HYPERCHOLESTEROLEMIA: Status: ACTIVE | Noted: 2025-06-02

## 2025-06-02 PROCEDURE — 93000 ELECTROCARDIOGRAM COMPLETE: CPT | Performed by: INTERNAL MEDICINE

## 2025-06-02 NOTE — PROGRESS NOTES
Cardiology Follow Up    Cecil Hall  1960  [id number]  [facility] [address] [id number] [phone]    1. Nonocclusive coronary atherosclerosis of native coronary artery  POCT ECG      2. NSTEMI (non-ST elevated myocardial infarction) (HCC)  POCT ECG      3. Hypercholesterolemia        4. Primary hypertension              Discussion/Summary:All of his assessed cardiac problems are stable. I have reviewed his medications and made no changes. No cardiac testing is ordered.  RTO 1 year.      Interval History: He has not had any cardiac problems since his last OV. He is active and denies CP, SOB, LE edema.  His weight is up from 197 to 230 lbs. He quit smoking 1 year ago.      /86  LDL 47     Underwent non-urgent LHC 2024 which showed 45% LAD disease only.  Most likely etiology was felt to be coronary vasospasm and he was placed on amlodipine.  Echo showed preserved LVEF.  Similar presentation in  where normal coronary arteries were noted on LHC.          Problem List[1]  Past Medical History[2]  Social History     Socioeconomic History    Marital status: /Civil Union     Spouse name: Not on file    Number of children: Not on file    Years of education: Not on file    Highest education level: Not on file   Occupational History    Not on file   Tobacco Use    Smoking status: Former     Current packs/day: 0.00     Types: Cigarettes     Quit date:      Years since quittin.4    Smokeless tobacco: Never   Vaping Use    Vaping status: Never Used   Substance and Sexual Activity    Alcohol use: Not on file    Drug use: Not on file    Sexual activity: Not on file   Other Topics Concern    Not on file   Social History Narrative    Not on file     Social Drivers of Health     Financial Resource Strain: Not on file   Food Insecurity: Not on file   Transportation Needs: Not on  file   Physical Activity: Not on file   Stress: Not on file   Social Connections: Not on file   Intimate Partner Violence: Not on file   Housing Stability: Not on file      Family History[3]  Past Surgical History[4]  Current Medications[5]  No Known Allergies  Vitals:    05/29/25 1521   BP: 136/88   BP Location: Left arm   Patient Position: Sitting   Cuff Size: Large   Pulse: 96   SpO2: 99%   Weight: 104 kg (230 lb)   Height: 6' (1.829 m)     Weight (last 2 days)       None           Blood pressure 136/88, pulse 96, height 6' (1.829 m), weight 104 kg (230 lb), SpO2 99%., Body mass index is 31.19 kg/m².    Labs:  Appointment on 03/06/2025   Component Date Value    WBC 03/06/2025 9.29     RBC 03/06/2025 4.30     Hemoglobin 03/06/2025 14.0     Hematocrit 03/06/2025 40.9     MCV 03/06/2025 95     MCH 03/06/2025 32.6     MCHC 03/06/2025 34.2     RDW 03/06/2025 12.4     Platelets 03/06/2025 252     MPV 03/06/2025 11.6     Sodium 03/06/2025 140     Potassium 03/06/2025 4.5     Chloride 03/06/2025 103     CO2 03/06/2025 27     ANION GAP 03/06/2025 10     BUN 03/06/2025 9     Creatinine 03/06/2025 1.01     Glucose, Fasting 03/06/2025 104 (H)     Calcium 03/06/2025 9.4     AST 03/06/2025 23     ALT 03/06/2025 25     Alkaline Phosphatase 03/06/2025 77     Total Protein 03/06/2025 7.7     Albumin 03/06/2025 4.2     Total Bilirubin 03/06/2025 0.63     eGFR 03/06/2025 78     Cholesterol 03/06/2025 101     Triglycerides 03/06/2025 93     HDL, Direct 03/06/2025 35 (L)     LDL Calculated 03/06/2025 47     Non-HDL-Chol (CHOL-HDL) 03/06/2025 66      Imaging: No results found.    Review of Systems:  Review of Systems   Constitutional:  Negative for diaphoresis, fatigue, fever and unexpected weight change.   HENT: Negative.     Respiratory:  Negative for cough, shortness of breath and wheezing.    Cardiovascular:  Negative for chest pain, palpitations and leg swelling.   Gastrointestinal:  Negative for abdominal pain, diarrhea and  nausea.   Musculoskeletal:  Negative for gait problem and myalgias.   Skin:  Negative for rash.   Neurological:  Negative for dizziness and numbness.   Psychiatric/Behavioral: Negative.         Physical Exam:  Physical Exam  Constitutional:       Appearance: He is well-developed.   HENT:      Head: Normocephalic and atraumatic.     Eyes:      Pupils: Pupils are equal, round, and reactive to light.     Neck:      Vascular: No JVD.     Cardiovascular:      Rate and Rhythm: Regular rhythm.      Pulses: Normal pulses.           Carotid pulses are 2+ on the right side and 2+ on the left side.     Heart sounds: S1 normal and S2 normal.   Pulmonary:      Effort: Pulmonary effort is normal.      Breath sounds: Normal breath sounds. No wheezing or rales.   Abdominal:      General: Bowel sounds are normal.      Palpations: Abdomen is soft.     Musculoskeletal:         General: No tenderness. Normal range of motion.      Cervical back: Normal range of motion and neck supple.     Skin:     General: Skin is warm.     Neurological:      Mental Status: He is alert and oriented to person, place, and time.      Cranial Nerves: No cranial nerve deficit.      Deep Tendon Reflexes: Reflexes are normal and symmetric.              [1]   Patient Active Problem List  Diagnosis    NSTEMI (non-ST elevated myocardial infarction) (HCC)    Tobacco abuse    Gastroesophageal reflux disease without esophagitis    Nonocclusive coronary atherosclerosis of native coronary artery    Coronary vasospasm (HCC)    Primary hypertension    Hypercholesterolemia   [2]   Past Medical History:  Diagnosis Date    GERD (gastroesophageal reflux disease)     Pneumohemothorax    [3] No family history on file.  [4]   Past Surgical History:  Procedure Laterality Date    CARDIAC CATHETERIZATION Left 5/24/2024    Procedure: Cardiac Left Heart Cath;  Surgeon: Sylwia Crouch DO;  Location: [facility];  Service: Cardiology    CARDIAC CATHETERIZATION N/A  5/24/2024    Procedure: Cardiac Coronary Angiogram;  Surgeon: Sylwia Crouch DO;  Location: [facility];  Service: Cardiology    HERNIA REPAIR     [5]   Current Outpatient Medications:     amLODIPine (NORVASC) 2.5 mg tablet, Take 1 tablet (2.5 mg total) by mouth daily, Disp: 30 tablet, Rfl: 2    aspirin 81 mg chewable tablet, Chew 1 tablet (81 mg total) daily, Disp: 30 tablet, Rfl: 2    atorvastatin (LIPITOR) 80 mg tablet, Take 1 tablet (80 mg total) by mouth daily at bedtime, Disp: 30 tablet, Rfl: 2    Coenzyme Q10 (Co Q-10) 200 MG CAPS, Take 200 mg by mouth in the morning, Disp: , Rfl:     nitroglycerin (NITROSTAT) 0.4 mg SL tablet, Place 1 tablet (0.4 mg total) under the tongue every 5 (five) minutes as needed for chest pain, Disp: 30 tablet, Rfl: 0    omeprazole (PriLOSEC) 20 mg delayed release capsule, Take 20 mg by mouth in the morning., Disp: , Rfl:     nicotine (NICODERM CQ) 21 mg/24 hr TD 24 hr patch, Place 1 patch on the skin over 24 hours daily as needed (tobacco hx) (Patient not taking: Reported on 5/29/2025), Disp: 28 patch, Rfl: 0

## (undated) DEVICE — GLIDESHEATH BASIC HYDROPHILIC COATED INTRODUCER SHEATH: Brand: GLIDESHEATH

## (undated) DEVICE — TR BAND RADIAL ARTERY COMPRESSION DEVICE: Brand: TR BAND

## (undated) DEVICE — Device: Brand: PROWATER

## (undated) DEVICE — DGW .035 FC J3MM 260CM TEF: Brand: EMERALD

## (undated) DEVICE — GUIDEWIRE WHOLEY HI TORQUE INTERM MOD J .035 145CM

## (undated) DEVICE — RADIFOCUS OPTITORQUE ANGIOGRAPHIC CATHETER: Brand: OPTITORQUE